# Patient Record
Sex: FEMALE | Race: WHITE | NOT HISPANIC OR LATINO | Employment: UNEMPLOYED | URBAN - NONMETROPOLITAN AREA
[De-identification: names, ages, dates, MRNs, and addresses within clinical notes are randomized per-mention and may not be internally consistent; named-entity substitution may affect disease eponyms.]

---

## 2020-01-14 ENCOUNTER — HOSPITAL ENCOUNTER (INPATIENT)
Facility: HOSPITAL | Age: 30
LOS: 3 days | Discharge: HOME | End: 2020-01-17
Attending: PSYCHIATRY & NEUROLOGY | Admitting: PSYCHIATRY & NEUROLOGY

## 2020-01-14 ENCOUNTER — HOSPITAL ENCOUNTER (EMERGENCY)
Facility: HOSPITAL | Age: 30
Discharge: STILL ADMITTED OR ADMINISTRATIVE | End: 2020-01-14
Attending: FAMILY MEDICINE

## 2020-01-14 VITALS
BODY MASS INDEX: 20.32 KG/M2 | HEIGHT: 72 IN | DIASTOLIC BLOOD PRESSURE: 63 MMHG | HEART RATE: 93 BPM | WEIGHT: 150 LBS | TEMPERATURE: 98.9 F | SYSTOLIC BLOOD PRESSURE: 104 MMHG | OXYGEN SATURATION: 98 % | RESPIRATION RATE: 18 BRPM

## 2020-01-14 DIAGNOSIS — F32.9 MAJOR DEPRESSIVE DISORDER, REMISSION STATUS UNSPECIFIED, UNSPECIFIED WHETHER RECURRENT: Primary | ICD-10-CM

## 2020-01-14 DIAGNOSIS — R45.851 SUICIDAL IDEATION: ICD-10-CM

## 2020-01-14 LAB
6-ACETYL MORPHINE: NEGATIVE
ALBUMIN SERPL-MCNC: 4.26 G/DL (ref 3.5–5.2)
ALBUMIN/GLOB SERPL: 1.2 G/DL
ALP SERPL-CCNC: 82 U/L (ref 39–117)
ALT SERPL W P-5'-P-CCNC: 29 U/L (ref 1–33)
AMPHET+METHAMPHET UR QL: POSITIVE
ANION GAP SERPL CALCULATED.3IONS-SCNC: 12 MMOL/L (ref 5–15)
AST SERPL-CCNC: 31 U/L (ref 1–32)
B-HCG UR QL: NEGATIVE
BARBITURATES UR QL SCN: NEGATIVE
BENZODIAZ UR QL SCN: NEGATIVE
BILIRUB SERPL-MCNC: 0.3 MG/DL (ref 0.2–1.2)
BILIRUB UR QL STRIP: NEGATIVE
BUN BLD-MCNC: 15 MG/DL (ref 6–20)
BUN/CREAT SERPL: 23.8 (ref 7–25)
BUPRENORPHINE SERPL-MCNC: NEGATIVE NG/ML
CALCIUM SPEC-SCNC: 9.7 MG/DL (ref 8.6–10.5)
CANNABINOIDS SERPL QL: NEGATIVE
CHLORIDE SERPL-SCNC: 103 MMOL/L (ref 98–107)
CLARITY UR: CLEAR
CO2 SERPL-SCNC: 25 MMOL/L (ref 22–29)
COCAINE UR QL: NEGATIVE
COLOR UR: YELLOW
CREAT BLD-MCNC: 0.63 MG/DL (ref 0.57–1)
DEPRECATED RDW RBC AUTO: 41.6 FL (ref 37–54)
EOSINOPHIL # BLD MANUAL: 0.2 10*3/MM3 (ref 0–0.4)
EOSINOPHIL NFR BLD MANUAL: 2 % (ref 0.3–6.2)
ERYTHROCYTE [DISTWIDTH] IN BLOOD BY AUTOMATED COUNT: 12.6 % (ref 12.3–15.4)
ETHANOL BLD-MCNC: <10 MG/DL (ref 0–10)
ETHANOL UR QL: <0.01 %
GFR SERPL CREATININE-BSD FRML MDRD: 110 ML/MIN/1.73
GLOBULIN UR ELPH-MCNC: 3.6 GM/DL
GLUCOSE BLD-MCNC: 84 MG/DL (ref 65–99)
GLUCOSE UR STRIP-MCNC: NEGATIVE MG/DL
HCT VFR BLD AUTO: 45 % (ref 34–46.6)
HGB BLD-MCNC: 14.7 G/DL (ref 12–15.9)
HGB UR QL STRIP.AUTO: NEGATIVE
HYPOCHROMIA BLD QL: ABNORMAL
KETONES UR QL STRIP: ABNORMAL
LEUKOCYTE ESTERASE UR QL STRIP.AUTO: NEGATIVE
LYMPHOCYTES # BLD MANUAL: 4.65 10*3/MM3 (ref 0.7–3.1)
LYMPHOCYTES NFR BLD MANUAL: 3 % (ref 5–12)
LYMPHOCYTES NFR BLD MANUAL: 47 % (ref 19.6–45.3)
MAGNESIUM SERPL-MCNC: 1.9 MG/DL (ref 1.6–2.6)
MCH RBC QN AUTO: 29.6 PG (ref 26.6–33)
MCHC RBC AUTO-ENTMCNC: 32.7 G/DL (ref 31.5–35.7)
MCV RBC AUTO: 90.7 FL (ref 79–97)
METHADONE UR QL SCN: NEGATIVE
MONOCYTES # BLD AUTO: 0.3 10*3/MM3 (ref 0.1–0.9)
NEUTROPHILS # BLD AUTO: 4.75 10*3/MM3 (ref 1.7–7)
NEUTROPHILS NFR BLD MANUAL: 48 % (ref 42.7–76)
NITRITE UR QL STRIP: NEGATIVE
OPIATES UR QL: NEGATIVE
OXYCODONE UR QL SCN: NEGATIVE
PCP UR QL SCN: NEGATIVE
PH UR STRIP.AUTO: 6 [PH] (ref 5–8)
PLAT MORPH BLD: NORMAL
PLATELET # BLD AUTO: 380 10*3/MM3 (ref 140–450)
PMV BLD AUTO: 8.8 FL (ref 6–12)
POTASSIUM BLD-SCNC: 4.1 MMOL/L (ref 3.5–5.2)
PROT SERPL-MCNC: 7.9 G/DL (ref 6–8.5)
PROT UR QL STRIP: NEGATIVE
RBC # BLD AUTO: 4.96 10*6/MM3 (ref 3.77–5.28)
SCAN SLIDE: NORMAL
SODIUM BLD-SCNC: 140 MMOL/L (ref 136–145)
SP GR UR STRIP: >1.03 (ref 1–1.03)
UROBILINOGEN UR QL STRIP: ABNORMAL
WBC NRBC COR # BLD: 9.89 10*3/MM3 (ref 3.4–10.8)

## 2020-01-14 PROCEDURE — 80307 DRUG TEST PRSMV CHEM ANLYZR: CPT | Performed by: NURSE PRACTITIONER

## 2020-01-14 PROCEDURE — 83735 ASSAY OF MAGNESIUM: CPT | Performed by: NURSE PRACTITIONER

## 2020-01-14 PROCEDURE — 80053 COMPREHEN METABOLIC PANEL: CPT | Performed by: NURSE PRACTITIONER

## 2020-01-14 PROCEDURE — 85025 COMPLETE CBC W/AUTO DIFF WBC: CPT | Performed by: NURSE PRACTITIONER

## 2020-01-14 PROCEDURE — 85007 BL SMEAR W/DIFF WBC COUNT: CPT | Performed by: NURSE PRACTITIONER

## 2020-01-14 PROCEDURE — 81025 URINE PREGNANCY TEST: CPT | Performed by: NURSE PRACTITIONER

## 2020-01-14 PROCEDURE — 81003 URINALYSIS AUTO W/O SCOPE: CPT | Performed by: NURSE PRACTITIONER

## 2020-01-14 RX ORDER — NICOTINE 21 MG/24HR
1 PATCH, TRANSDERMAL 24 HOURS TRANSDERMAL
Status: DISCONTINUED | OUTPATIENT
Start: 2020-01-15 | End: 2020-01-15

## 2020-01-14 RX ORDER — BENZTROPINE MESYLATE 1 MG/ML
1 INJECTION INTRAMUSCULAR; INTRAVENOUS ONCE AS NEEDED
Status: DISCONTINUED | OUTPATIENT
Start: 2020-01-14 | End: 2020-01-17 | Stop reason: HOSPADM

## 2020-01-14 RX ORDER — MELOXICAM 7.5 MG/1
7.5 TABLET ORAL DAILY
Status: DISCONTINUED | OUTPATIENT
Start: 2020-01-15 | End: 2020-01-17 | Stop reason: HOSPADM

## 2020-01-14 RX ORDER — ECHINACEA PURPUREA EXTRACT 125 MG
2 TABLET ORAL AS NEEDED
Status: DISCONTINUED | OUTPATIENT
Start: 2020-01-14 | End: 2020-01-17 | Stop reason: HOSPADM

## 2020-01-14 RX ORDER — PANTOPRAZOLE SODIUM 40 MG/1
40 TABLET, DELAYED RELEASE ORAL DAILY
Status: DISCONTINUED | OUTPATIENT
Start: 2020-01-15 | End: 2020-01-17 | Stop reason: HOSPADM

## 2020-01-14 RX ORDER — ONDANSETRON 4 MG/1
4 TABLET, FILM COATED ORAL EVERY 6 HOURS PRN
Status: DISCONTINUED | OUTPATIENT
Start: 2020-01-14 | End: 2020-01-17 | Stop reason: HOSPADM

## 2020-01-14 RX ORDER — CYCLOBENZAPRINE HCL 10 MG
5 TABLET ORAL 3 TIMES DAILY PRN
Status: DISCONTINUED | OUTPATIENT
Start: 2020-01-14 | End: 2020-01-16

## 2020-01-14 RX ORDER — PANTOPRAZOLE SODIUM 40 MG/1
40 TABLET, DELAYED RELEASE ORAL DAILY
Status: ON HOLD | COMMUNITY
End: 2020-11-16

## 2020-01-14 RX ORDER — NICOTINE 21 MG/24HR
1 PATCH, TRANSDERMAL 24 HOURS TRANSDERMAL ONCE
Status: DISCONTINUED | OUTPATIENT
Start: 2020-01-14 | End: 2020-01-14 | Stop reason: HOSPADM

## 2020-01-14 RX ORDER — MELOXICAM 7.5 MG/1
7.5 TABLET ORAL DAILY
Status: ON HOLD | COMMUNITY
End: 2020-11-16

## 2020-01-14 RX ORDER — ACETAMINOPHEN 325 MG/1
650 TABLET ORAL EVERY 6 HOURS PRN
Status: DISCONTINUED | OUTPATIENT
Start: 2020-01-14 | End: 2020-01-17 | Stop reason: HOSPADM

## 2020-01-14 RX ORDER — IBUPROFEN 400 MG/1
400 TABLET ORAL EVERY 6 HOURS PRN
Status: DISCONTINUED | OUTPATIENT
Start: 2020-01-14 | End: 2020-01-16

## 2020-01-14 RX ORDER — FAMOTIDINE 20 MG/1
20 TABLET, FILM COATED ORAL 2 TIMES DAILY PRN
Status: DISCONTINUED | OUTPATIENT
Start: 2020-01-14 | End: 2020-01-17 | Stop reason: HOSPADM

## 2020-01-14 RX ORDER — CYCLOBENZAPRINE HCL 5 MG
5 TABLET ORAL 3 TIMES DAILY PRN
Status: ON HOLD | COMMUNITY
End: 2020-11-16

## 2020-01-14 RX ORDER — BENZTROPINE MESYLATE 1 MG/1
2 TABLET ORAL ONCE AS NEEDED
Status: DISCONTINUED | OUTPATIENT
Start: 2020-01-14 | End: 2020-01-17 | Stop reason: HOSPADM

## 2020-01-14 RX ORDER — TRAZODONE HYDROCHLORIDE 50 MG/1
50 TABLET ORAL NIGHTLY PRN
Status: DISCONTINUED | OUTPATIENT
Start: 2020-01-14 | End: 2020-01-15

## 2020-01-14 RX ORDER — ALUMINA, MAGNESIA, AND SIMETHICONE 2400; 2400; 240 MG/30ML; MG/30ML; MG/30ML
15 SUSPENSION ORAL EVERY 6 HOURS PRN
Status: DISCONTINUED | OUTPATIENT
Start: 2020-01-14 | End: 2020-01-17 | Stop reason: HOSPADM

## 2020-01-14 RX ORDER — HYDROXYZINE 50 MG/1
50 TABLET, FILM COATED ORAL EVERY 6 HOURS PRN
Status: DISCONTINUED | OUTPATIENT
Start: 2020-01-14 | End: 2020-01-17 | Stop reason: HOSPADM

## 2020-01-14 RX ORDER — LOPERAMIDE HYDROCHLORIDE 2 MG/1
2 CAPSULE ORAL
Status: DISCONTINUED | OUTPATIENT
Start: 2020-01-14 | End: 2020-01-17 | Stop reason: HOSPADM

## 2020-01-14 RX ORDER — BENZONATATE 100 MG/1
100 CAPSULE ORAL 3 TIMES DAILY PRN
Status: DISCONTINUED | OUTPATIENT
Start: 2020-01-14 | End: 2020-01-17 | Stop reason: HOSPADM

## 2020-01-14 RX ADMIN — ALUMINUM HYDROXIDE, MAGNESIUM HYDROXIDE, AND DIMETHICONE 15 ML: 400; 400; 40 SUSPENSION ORAL at 23:46

## 2020-01-14 RX ADMIN — TRAZODONE HYDROCHLORIDE 50 MG: 50 TABLET ORAL at 23:46

## 2020-01-14 RX ADMIN — ACETAMINOPHEN 650 MG: 325 TABLET ORAL at 23:46

## 2020-01-14 RX ADMIN — NICOTINE 1 PATCH: 21 PATCH, EXTENDED RELEASE TRANSDERMAL at 22:20

## 2020-01-15 PROBLEM — B19.20 HEPATITIS C: Status: ACTIVE | Noted: 2020-01-15

## 2020-01-15 PROBLEM — F15.20 METHAMPHETAMINE USE DISORDER, SEVERE (HCC): Status: ACTIVE | Noted: 2020-01-15

## 2020-01-15 PROBLEM — F32.A ACUTE DEPRESSION: Status: ACTIVE | Noted: 2020-01-14

## 2020-01-15 PROBLEM — F11.20 OPIOID USE DISORDER, SEVERE, DEPENDENCE (HCC): Status: ACTIVE | Noted: 2020-01-15

## 2020-01-15 LAB
CRP SERPL-MCNC: 0.16 MG/DL (ref 0–0.5)
HAV IGM SERPL QL IA: ABNORMAL
HBV CORE IGM SERPL QL IA: ABNORMAL
HBV SURFACE AG SERPL QL IA: ABNORMAL
HCV AB SER DONR QL: REACTIVE
HIV1+2 AB SER QL: NORMAL
HOLD SPECIMEN: NORMAL
TSH SERPL DL<=0.05 MIU/L-ACNC: 0.65 UIU/ML (ref 0.27–4.2)

## 2020-01-15 PROCEDURE — 82607 VITAMIN B-12: CPT | Performed by: HOSPITALIST

## 2020-01-15 PROCEDURE — 99254 IP/OBS CNSLTJ NEW/EST MOD 60: CPT | Performed by: HOSPITALIST

## 2020-01-15 PROCEDURE — G0432 EIA HIV-1/HIV-2 SCREEN: HCPCS | Performed by: PSYCHIATRY & NEUROLOGY

## 2020-01-15 PROCEDURE — 86140 C-REACTIVE PROTEIN: CPT | Performed by: HOSPITALIST

## 2020-01-15 PROCEDURE — 82746 ASSAY OF FOLIC ACID SERUM: CPT | Performed by: HOSPITALIST

## 2020-01-15 PROCEDURE — 99223 1ST HOSP IP/OBS HIGH 75: CPT | Performed by: PSYCHIATRY & NEUROLOGY

## 2020-01-15 PROCEDURE — 84443 ASSAY THYROID STIM HORMONE: CPT | Performed by: HOSPITALIST

## 2020-01-15 PROCEDURE — 93010 ELECTROCARDIOGRAM REPORT: CPT | Performed by: INTERNAL MEDICINE

## 2020-01-15 PROCEDURE — 80074 ACUTE HEPATITIS PANEL: CPT | Performed by: PSYCHIATRY & NEUROLOGY

## 2020-01-15 PROCEDURE — 93005 ELECTROCARDIOGRAM TRACING: CPT | Performed by: PSYCHIATRY & NEUROLOGY

## 2020-01-15 RX ORDER — MIRTAZAPINE 15 MG/1
15 TABLET, FILM COATED ORAL NIGHTLY
Status: DISCONTINUED | OUTPATIENT
Start: 2020-01-15 | End: 2020-01-17 | Stop reason: HOSPADM

## 2020-01-15 RX ORDER — NICOTINE 21 MG/24HR
1 PATCH, TRANSDERMAL 24 HOURS TRANSDERMAL
Status: DISCONTINUED | OUTPATIENT
Start: 2020-01-15 | End: 2020-01-17 | Stop reason: HOSPADM

## 2020-01-15 RX ORDER — RISPERIDONE 0.25 MG/1
0.25 TABLET ORAL EVERY 12 HOURS SCHEDULED
Status: DISCONTINUED | OUTPATIENT
Start: 2020-01-15 | End: 2020-01-16

## 2020-01-15 RX ADMIN — MELOXICAM 7.5 MG: 7.5 TABLET ORAL at 08:55

## 2020-01-15 RX ADMIN — CYCLOBENZAPRINE 5 MG: 10 TABLET, FILM COATED ORAL at 16:57

## 2020-01-15 RX ADMIN — NICOTINE 1 PATCH: 14 PATCH TRANSDERMAL at 13:23

## 2020-01-15 RX ADMIN — ACETAMINOPHEN 650 MG: 325 TABLET ORAL at 16:59

## 2020-01-15 RX ADMIN — MIRTAZAPINE 15 MG: 15 TABLET, FILM COATED ORAL at 20:18

## 2020-01-15 RX ADMIN — CYCLOBENZAPRINE 5 MG: 10 TABLET, FILM COATED ORAL at 08:55

## 2020-01-15 RX ADMIN — IBUPROFEN 400 MG: 400 TABLET, FILM COATED ORAL at 20:18

## 2020-01-15 RX ADMIN — MUPIROCIN 1 APPLICATION: 20 OINTMENT TOPICAL at 20:19

## 2020-01-15 RX ADMIN — RISPERIDONE 0.25 MG: 0.25 TABLET, FILM COATED ORAL at 11:45

## 2020-01-15 RX ADMIN — MUPIROCIN: 20 OINTMENT TOPICAL at 11:45

## 2020-01-15 RX ADMIN — RISPERIDONE 0.25 MG: 0.25 TABLET, FILM COATED ORAL at 20:18

## 2020-01-15 RX ADMIN — PANTOPRAZOLE SODIUM 40 MG: 40 TABLET, DELAYED RELEASE ORAL at 08:55

## 2020-01-15 RX ADMIN — HYDROXYZINE HYDROCHLORIDE 50 MG: 50 TABLET ORAL at 20:18

## 2020-01-15 NOTE — NURSING NOTE
Phone contact with dr salter. Presented all clinicals/labs. Orders received. Admit to A/E. S/P 3. Routine orders. Orders read back and confirmed. Pt advised of admission; pt status unchanged at this time.

## 2020-01-15 NOTE — ED PROVIDER NOTES
Subjective   Patient is a 29-year-old female that presents to the ED for depression.  The patient says she has been depressed for several months but it is gradually getting worse.  She says that she does not care if she lives or dies.  She says that she is unable to eat and unable to sleep.  She says that she has been on medication previously and nothing seems to help.  She says that she has had suicidal thoughts.  She says that she does not want to be here and the only reason that she is here is because her family made her come.      History provided by:  Patient   used: No    Suicidal   Presenting symptoms: depression, hallucinations, suicidal thoughts and suicidal threats    Degree of incapacity (severity):  Moderate  Onset quality:  Gradual  Timing:  Constant  Progression:  Waxing and waning  Chronicity:  Recurrent  Context: drug abuse and noncompliance    Relieved by:  Nothing  Worsened by:  Nothing  Ineffective treatments:  None tried  Associated symptoms: anxiety, appetite change, feelings of worthlessness, insomnia and irritability    Risk factors: family hx of mental illness, hx of mental illness and hx of suicide attempts        Review of Systems   Constitutional: Positive for appetite change and irritability.   HENT: Negative.    Eyes: Negative.    Respiratory: Negative.    Cardiovascular: Negative.    Gastrointestinal: Negative.    Endocrine: Negative.    Genitourinary: Negative.    Musculoskeletal: Negative.    Skin: Negative.    Allergic/Immunologic: Negative.    Neurological: Negative.    Hematological: Negative.    Psychiatric/Behavioral: Positive for decreased concentration, hallucinations and suicidal ideas. The patient is nervous/anxious and has insomnia.    All other systems reviewed and are negative.      No past medical history on file.    Allergies   Allergen Reactions   • Penicillins Hives   • Triple Antibiotic [Bacitracin-Neomycin-Polymyxin] Hives       No past surgical  history on file.    No family history on file.    Social History     Socioeconomic History   • Marital status:      Spouse name: Not on file   • Number of children: Not on file   • Years of education: Not on file   • Highest education level: Not on file           Objective   Physical Exam   Constitutional: She is oriented to person, place, and time. She appears well-developed and well-nourished.   HENT:   Head: Normocephalic.   Eyes: Pupils are equal, round, and reactive to light. EOM are normal.   Neck: Normal range of motion. Neck supple.   Cardiovascular: Normal rate, regular rhythm, normal heart sounds and intact distal pulses.   Pulmonary/Chest: Effort normal and breath sounds normal.   Abdominal: Soft. Bowel sounds are normal.   Musculoskeletal: Normal range of motion.   Neurological: She is alert and oriented to person, place, and time.   Skin: Skin is warm. Capillary refill takes less than 2 seconds.   Psychiatric: Her mood appears anxious. Her speech is tangential. She is agitated and hyperactive. Thought content is paranoid. Cognition and memory are normal. She expresses impulsivity. She exhibits a depressed mood. She expresses suicidal ideation.   Nursing note and vitals reviewed.      Procedures           ED Course                                               MDM    Final diagnoses:   Major depressive disorder, remission status unspecified, unspecified whether recurrent   Suicidal ideation            Nickolas Greene, APRN  01/15/20 0324
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
## 2020-01-15 NOTE — CONSULTS
[facility] HOSPITALIST CONSULT NOTE     Consults    Patient Identification:  Name:  Laurel Fu  Age:  29 y.o.  Sex:  female  :  1990  MRN:  [id number]  Visit Number:  [id number]  Primary care provider:  Provider, No Known    Reason for Consult: Back pain and leg pain    History of presenting illness: Exam is assisted by the units female nurse, patient is in bed, reports she has different sensation of her legs associated with thick and tight sensation especially her right lower extremity along the lateral aspect of the legs.  She reports this has been going on for some time but worsened this past few days she reports she had a fall approximately 2 weeks ago at home and landed on her sacrococcygeal area causing pain and swelling with bruising.  Apparently she was seen in the emergency room at [facility] approximately week ago where she was told she has some swelling and was treated with NSAIDs.  Patient denies incontinence, denies leg swelling, fever or chills, she reports tenderness and pain especially on her right sacroiliac, coccyx area area and right hip.  Patient denies history of similar symptoms in the past.    Patient is admitted at treatment Center for depression, patient reports her last IV drug use was approximately a week ago consisting of methamphetamines.  ---------------------------------------------------------------------------------------------------------------------  Review of Systems:   Review of Systems   Constitutional: Positive for fatigue. Negative for appetite change, chills, diaphoresis, fever and unexpected weight change.   HENT: Negative.    Eyes: Negative.    Respiratory: Negative.    Cardiovascular: Negative.    Gastrointestinal: Negative.    Endocrine: Negative.    Genitourinary: Negative.    Musculoskeletal: Positive for back pain. Negative for arthralgias, gait problem, joint swelling, myalgias, neck pain and neck stiffness.   Skin: Negative.     Allergic/Immunologic: Negative.    Neurological: Negative.    Hematological: Negative.    Psychiatric/Behavioral: Positive for dysphoric mood, sleep disturbance and suicidal ideas. Negative for agitation, behavioral problems, confusion, decreased concentration, hallucinations and self-injury. The patient is not nervous/anxious and is not hyperactive.       ---------------------------------------------------------------------------------------------------------------------   Past History:  Family History   Problem Relation Age of Onset   • Depression Mother    • Anxiety disorder Mother    • Bipolar disorder Mother    • Alcohol abuse Maternal Grandmother    • Schizophrenia Maternal Grandmother      Past Medical History:   Diagnosis Date   • Depression    • Obsessive-compulsive disorder    • Psychosis (CMS/[facility])     visual pretty much all the time   • PTSD (post-traumatic stress disorder)    • Seizures (CMS/HCC)     states when she stopped opiates a long time ago     Past Surgical History:   Procedure Laterality Date   • APPENDECTOMY  2008   • HYSTERECTOMY  2017   • LAPAROSCOPIC TUBAL LIGATION  2013     Social History     Socioeconomic History   • Marital status:      Spouse name: Not on file   • Number of children: Not on file   • Years of education: Not on file   • Highest education level: Not on file   Tobacco Use   • Smoking status: Current Every Day Smoker     Packs/day: 1.00     Years: 17.00     Pack years: 17.00     Types: Cigarettes   • Smokeless tobacco: Never Used   Substance and Sexual Activity   • Alcohol use: Not Currently   • Drug use: Yes     Types: Heroin, Methamphetamines   • Sexual activity: Yes     Partners: Male     ---------------------------------------------------------------------------------------------------------------------   Allergies:  Penicillins and Triple antibiotic  [bacitracin-neomycin-polymyxin]  ---------------------------------------------------------------------------------------------------------------------   Prior to Admission Medications     Prescriptions Last Dose Informant Patient Reported? Taking?    cyclobenzaprine (FLEXERIL) 5 MG tablet 1/14/2020 Self Yes Yes    Take 5 mg by mouth 3 (Three) Times a Day As Needed for Muscle Spasms.    meloxicam (MOBIC) 7.5 MG tablet 1/14/2020 Self Yes Yes    Take 7.5 mg by mouth Daily.    pantoprazole (PROTONIX) 40 MG EC tablet 1/14/2020 Self Yes Yes    Take 40 mg by mouth Daily.        Hospital Meds:    meloxicam 7.5 mg Oral Daily   mirtazapine 15 mg Oral Nightly   mupirocin  Topical Q12H   nicotine 1 patch Transdermal Q24H   pantoprazole 40 mg Oral Daily   risperiDONE 0.25 mg Oral Q12H        ---------------------------------------------------------------------------------------------------------------------   Vital Signs:  Temp:  [97.1 °F (36.2 °C)-98.9 °F (37.2 °C)] 98.5 °F (36.9 °C)  Heart Rate:  [] 98  Resp:  [18] 18  BP: (104-134)/(57-78) 106/57      01/14/20  2305   Weight: 63.8 kg (140 lb 9.6 oz)     Body mass index is 19.07 kg/m².  ---------------------------------------------------------------------------------------------------------------------   Physical exam:  General: Comfortable,awake, alert, oriented to self, place, and time, well-developed, she is not in obvious respiratory stress, she follows commands and conversant.      Skin:  Skin is warm and dry. No rash noted. No pallor.  She has multiple needle tracks both antecubital area, no obvious signs of infection or redness.  No tenderness.  HENT:  Head:  Normocephalic and atraumatic.  Mouth:  Moist mucous membranes.    Eyes:  Conjunctivae and EOM are normal.  Pupils are equal, round, and reactive to light.  No scleral icterus.  No nystagmus  Neck:  Neck supple.  No JVD present.  No lymphadenopathy no bruit no palpable mass  Pulmonary/Chest:  No respiratory  distress, no wheezes, no crackles, with normal breath sounds and good air movement.  Cardiovascular:  Normal rate, regular rhythm and normal heart sounds with no murmur.  Abdominal:  Soft.  Bowel sounds are normal.  No distension and no tenderness.  No hepatosplenomegaly  Extremities:  No edema, no tenderness, and no deformity.  No red or swollen joints anywhere.  Strong pulses in all 4 extremities with no clubbing, no cyanosis.  She has hematoma greenish in color right anterior thigh at least 2 approximately 1-1/2 inches in size she thinks she may have hit herself, also anterior leg right side midportion.  Neurological:  Motor strength equal no obvious deficit, sensory grossly intact.   No cranial nerve deficit.  No tongue deviation.  No facial droop.  No slurred speech.  On detailed exam especially in the lower extremity, she has good position sense, equal tactile sensation, equal painful sensation, no straight leg raising, strength is equal.  DTR is equal both knee and ankle.  Back: No obvious deformity, no limitation of range of motion, no bruising, no skin break, he has subjective tenderness in the site right sacroiliac area, also tenderness on the coccygeal area.  ---------------------------------------------------------------------------------------------------------------------   ---------------------------------------------------------------------------------------------------------------------   Results from last 7 days   Lab Units 01/14/20 2053   WBC 10*3/mm3 9.89   HEMOGLOBIN g/dL 14.7   HEMATOCRIT % 45.0   MCV fL 90.7   MCHC g/dL 32.7   PLATELETS 10*3/mm3 380         Results from last 7 days   Lab Units 01/14/20  2053   SODIUM mmol/L 140   POTASSIUM mmol/L 4.1   MAGNESIUM mg/dL 1.9   CHLORIDE mmol/L 103   CO2 mmol/L 25.0   BUN mg/dL 15   CREATININE mg/dL 0.63   EGFR IF NONAFRICN AM mL/min/1.73 110   CALCIUM mg/dL 9.7   GLUCOSE mg/dL 84   ALBUMIN g/dL 4.26   BILIRUBIN mg/dL 0.3   ALK PHOS U/L 82   AST  (SGOT) U/L 31   ALT (SGPT) U/L 29   Estimated Creatinine Clearance: 130.3 mL/min (by C-G formula based on SCr of 0.63 mg/dL).  No results found for: AMMONIA          No results found for: HGBA1C  No results found for: TSH, FREET4  Lab Results   Component Value Date    PREGTESTUR Negative 01/14/2020     Pain Management Panel     Pain Management Panel Latest Ref Rng & Units 1/14/2020    AMPHETAMINES SCREEN, URINE Negative Positive(A)    BARBITURATES SCREEN Negative Negative    BENZODIAZEPINE SCREEN, URINE Negative Negative    BUPRENORPHINEUR Negative Negative    COCAINE SCREEN, URINE Negative Negative    METHADONE SCREEN, URINE Negative Negative        No results found for: BLOODCX  No results found for: URINECX  No results found for: WOUNDCX  No results found for: STOOLCX      EKG:       ---------------------------------------------------------------------------------------------------------------------   Imaging Results (Last 7 Days)     ** No results found for the last 168 hours. **        ----------------------------------------------------------------------------------------------------------------------  Assessment and Plan:  -Back pain/right sacroiliac and coccygeal area after a fall  -Right lower extremity paresthesia  -Hepatitis C likely due to intravenous drug use  -IV drug use including opiates and methamphetamines  -Acute depression per psychiatry    On exam patient does not have any obvious findings to suggest DVT, will start with x-ray of the lumbar spine, chest x-ray, TSH B12 and folate will be ordered, CRP.  Further work-up will be needed depending on the results of the initial tests ordered.  With regards to her hepatitis C, liver function test is normal, patient is advised to have an appointment with her primary care provider for GI referral for treatment of hepatitis but most importantly before being treated she needs to discontinue her IV drug use.    Thank you for consulting will follow the  patient with you closely.    Sammie Jones MD  01/15/20  6:35 PM

## 2020-01-15 NOTE — NURSING NOTE
1745 PATIENT C/O RIGHT CALF PAIN. PATIENT HAS EDEMA IN BILATERAL LOWER ANKLES. NO REDNESS, HEAT OR SWELLING NOTED IN EITHER LEG. PATIENT ADVISED TO ELEVATE FEET AND REST. V/S ARE STABLE. SEE CHART. DR. SWATHI HELMS NOTIFIED AND NEW ORDERS RECEIVED.

## 2020-01-15 NOTE — NURSING NOTE
Pt presents to intake reporting depression, talia, suicidal thoughts, with hx of od attempts x 2 in 12/2018, and this would likely be the plan again. States if she doesn't get help to get better, she will definitely make sure the next time.  Reports poor sleep and appetite, visual hallucinations, has no positive support, reporting she is past the point of hopeless. Admits to drug use past couple months to try to make herself feel better.   Pt searched, changed to hosp scrubs, belongings secured and placed in cabinet, and pt seated in monitored rm.   Per request, pt given sandwich box, and Gatorade.

## 2020-01-15 NOTE — PLAN OF CARE
Problem: Patient Care Overview  Goal: Plan of Care Review  Outcome: Ongoing (interventions implemented as appropriate)  Flowsheets  Taken 1/15/2020 1425  Progress: no change  Taken 1/15/2020 1051  Plan of Care Reviewed With: patient  Patient Agreement with Plan of Care: agrees  Note:   PATIENT VERBALIZES ANXIETY, DEPRESSION, AUDITORY HALLUCINATIONS AND SI WITH NO PLAN. PATIENT IS AOX3 AND ON FALLS PRECAUTIONS. PATIENT APPEARS STABLE WITH NO ACUTE S/S OF DISTRESS NOTED. NEW ORDERS: D/C TRAZODONE. BACTROBAN OINTMENT, REMERON 15MG, RISPERDAL 0.25MG, FLP
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
## 2020-01-16 ENCOUNTER — APPOINTMENT (OUTPATIENT)
Dept: GENERAL RADIOLOGY | Facility: HOSPITAL | Age: 30
End: 2020-01-16

## 2020-01-16 LAB
CHOLEST SERPL-MCNC: 131 MG/DL (ref 0–200)
FOLATE SERPL-MCNC: 5.91 NG/ML (ref 4.78–24.2)
HDLC SERPL-MCNC: 38 MG/DL (ref 40–60)
LDLC SERPL CALC-MCNC: 66 MG/DL (ref 0–100)
LDLC/HDLC SERPL: 1.75 {RATIO}
TRIGL SERPL-MCNC: 133 MG/DL (ref 0–150)
VIT B12 BLD-MCNC: 469 PG/ML (ref 211–946)
VLDLC SERPL-MCNC: 26.6 MG/DL

## 2020-01-16 PROCEDURE — 71046 X-RAY EXAM CHEST 2 VIEWS: CPT | Performed by: RADIOLOGY

## 2020-01-16 PROCEDURE — 72110 X-RAY EXAM L-2 SPINE 4/>VWS: CPT | Performed by: RADIOLOGY

## 2020-01-16 PROCEDURE — 99232 SBSQ HOSP IP/OBS MODERATE 35: CPT | Performed by: HOSPITALIST

## 2020-01-16 PROCEDURE — 99232 SBSQ HOSP IP/OBS MODERATE 35: CPT | Performed by: PSYCHIATRY & NEUROLOGY

## 2020-01-16 PROCEDURE — 72110 X-RAY EXAM L-2 SPINE 4/>VWS: CPT

## 2020-01-16 PROCEDURE — 80061 LIPID PANEL: CPT | Performed by: PSYCHIATRY & NEUROLOGY

## 2020-01-16 PROCEDURE — 71046 X-RAY EXAM CHEST 2 VIEWS: CPT

## 2020-01-16 RX ORDER — RISPERIDONE 0.25 MG/1
0.5 TABLET ORAL EVERY 12 HOURS SCHEDULED
Status: DISCONTINUED | OUTPATIENT
Start: 2020-01-16 | End: 2020-01-17 | Stop reason: HOSPADM

## 2020-01-16 RX ORDER — CYCLOBENZAPRINE HCL 10 MG
10 TABLET ORAL 3 TIMES DAILY PRN
Status: DISCONTINUED | OUTPATIENT
Start: 2020-01-16 | End: 2020-01-17 | Stop reason: HOSPADM

## 2020-01-16 RX ADMIN — PANTOPRAZOLE SODIUM 40 MG: 40 TABLET, DELAYED RELEASE ORAL at 08:44

## 2020-01-16 RX ADMIN — IBUPROFEN 400 MG: 400 TABLET, FILM COATED ORAL at 10:06

## 2020-01-16 RX ADMIN — HYDROXYZINE HYDROCHLORIDE 50 MG: 50 TABLET ORAL at 20:26

## 2020-01-16 RX ADMIN — CYCLOBENZAPRINE HYDROCHLORIDE 10 MG: 10 TABLET, FILM COATED ORAL at 20:26

## 2020-01-16 RX ADMIN — RISPERIDONE 0.5 MG: 0.25 TABLET, FILM COATED ORAL at 20:26

## 2020-01-16 RX ADMIN — MUPIROCIN: 20 OINTMENT TOPICAL at 20:28

## 2020-01-16 RX ADMIN — MIRTAZAPINE 15 MG: 15 TABLET, FILM COATED ORAL at 20:26

## 2020-01-16 RX ADMIN — NICOTINE 1 PATCH: 14 PATCH TRANSDERMAL at 11:00

## 2020-01-16 RX ADMIN — LOPERAMIDE HYDROCHLORIDE 2 MG: 2 CAPSULE ORAL at 10:06

## 2020-01-16 RX ADMIN — MUPIROCIN: 20 OINTMENT TOPICAL at 08:43

## 2020-01-16 RX ADMIN — ACETAMINOPHEN 650 MG: 325 TABLET ORAL at 20:26

## 2020-01-16 RX ADMIN — RISPERIDONE 0.25 MG: 0.25 TABLET, FILM COATED ORAL at 08:44

## 2020-01-16 RX ADMIN — MELOXICAM 7.5 MG: 7.5 TABLET ORAL at 08:43

## 2020-01-16 NOTE — PROGRESS NOTES
[facility]     Patient Identification:  Name:  Laurel Fu  Age:  29 y.o.  Sex:  female  :  1990  MRN:  [id number]  Visit Number:  [id number]  Primary Care Provider:  Provider, No Known    Length of stay:  2    Subjective: Patient seen and examined assisted by psychiatry unit nursing staff, patient is reporting persistence of her back pain mostly right sacroiliac with tenderness, pain is bearable, denies leg pain denies leg cramps.  Chest x-ray PA and lateral views unremarkable, CRP is normal, lumbosacral x-ray was also unremarkable.  My neurologic exam yesterday was unremarkable.    Chief Complaint: Back pain  ----------------------------------------------------------------------------------------------------------------------  Current Hospital Meds:    meloxicam 7.5 mg Oral Daily   mirtazapine 15 mg Oral Nightly   mupirocin  Topical Q12H   nicotine 1 patch Transdermal Q24H   pantoprazole 40 mg Oral Daily   risperiDONE 0.5 mg Oral Q12H        ----------------------------------------------------------------------------------------------------------------------  Vital Signs:  Temp:  [97.9 °F (36.6 °C)-98.5 °F (36.9 °C)] 98.3 °F (36.8 °C)  Heart Rate:  [] 90  Resp:  [18] 18  BP: ()/(57-83) 116/83       Tele:       20  2305   Weight: 63.8 kg (140 lb 9.6 oz)     Body mass index is 19.07 kg/m².  No intake or output data in the 24 hours ending 20 1738  Diet Regular  ----------------------------------------------------------------------------------------------------------------------  Physical exam:  General: Comfortable,awake, alert, oriented to self, place, and time, well-developed and well-nourished.  No respiratory distress.    Skin:  Skin is warm and dry. No rash noted. No pallor.    HENT:  Head:  Normocephalic and atraumatic.  Mouth:  Moist mucous membranes.    Eyes:  Conjunctivae and EOM are normal.  Pupils are equal, round, and reactive to  light.  No scleral icterus.    Neck:  Neck supple.  No JVD present.  No lymphadenopathy  Pulmonary/Chest:  No respiratory distress, no wheezes, no crackles, with normal breath sounds and good air movement.  Cardiovascular:  Normal rate, regular rhythm and normal heart sounds with no murmur.  Abdominal:  Soft.  Bowel sounds are normal.  No distension and no tenderness.   Extremities:  No edema, no tenderness, and no deformity.  No red or swollen joints anywhere.  Strong pulses in all 4 extremities with no clubbing, no cyanosis, she has needle tracks both antecubital, no erythema no signs of infection noted.  Neurological:  Motor strength equal no obvious deficit, sensory grossly intact.   No cranial nerve deficit.  No tongue deviation.  No facial droop.  No slurred speech.  No straight leg raising, sensory both lower extremities intact no symmetry, motor strength is equal.  Back: Subjective mild tenderness lower back mostly right sacroiliac area no deformity no redness no asymmetry.  ----------------------------------------------------------------------------------------------------------------------  ----------------------------------------------------------------------------------------------------------------------      Results from last 7 days   Lab Units 01/15/20  1931 01/14/20  2053   CRP mg/dL 0.16  --    WBC 10*3/mm3  --  9.89   HEMOGLOBIN g/dL  --  14.7   HEMATOCRIT %  --  45.0   MCV fL  --  90.7   MCHC g/dL  --  32.7   PLATELETS 10*3/mm3  --  380         Results from last 7 days   Lab Units 01/14/20 2053   SODIUM mmol/L 140   POTASSIUM mmol/L 4.1   MAGNESIUM mg/dL 1.9   CHLORIDE mmol/L 103   CO2 mmol/L 25.0   BUN mg/dL 15   CREATININE mg/dL 0.63   EGFR IF NONAFRICN AM mL/min/1.73 110   CALCIUM mg/dL 9.7   GLUCOSE mg/dL 84   ALBUMIN g/dL 4.26   BILIRUBIN mg/dL 0.3   ALK PHOS U/L 82   AST (SGOT) U/L 31   ALT (SGPT) U/L 29   Estimated Creatinine Clearance: 130.3 mL/min (by C-G formula based on SCr of 0.63  mg/dL).    No results found for: AMMONIA  Results from last 7 days   Lab Units 01/16/20  0521   CHOLESTEROL mg/dL 131   TRIGLYCERIDES mg/dL 133   HDL CHOL mg/dL 38*   LDL CHOL mg/dL 66     No results found for: BLOODCX  No results found for: URINECX  No results found for: WOUNDCX  No results found for: STOOLCX    I have personally looked at the labs and they are summarized above.  ----------------------------------------------------------------------------------------------------------------------  Imaging Results (Last 24 Hours)     Procedure Component Value Units Date/Time    XR Chest PA & Lateral [[id number]] Collected:  01/16/20 1523     Updated:  01/16/20 1526    Narrative:       EXAMINATION: XR CHEST PA AND LATERAL-      CLINICAL INDICATION:     back pain     TECHNIQUE:  XR CHEST PA AND LATERAL-      COMPARISON: NONE      FINDINGS:      Lungs are aerated.   Heart size, mediastinum, and pulmonary vascularity are unremarkable.   No pneumothorax.   No effusions.   No acute osseous findings.          Impression:       No radiographic evidence of acute cardiac or pulmonary  disease.     This report was finalized on 1/16/2020 3:24 PM by Dr. Gus Cabral MD.       XR Spine Lumbar Complete 4+VW [[id number]] Collected:  01/16/20 1523     Updated:  01/16/20 1525    Narrative:       EXAMINATION: XR SPINE LUMBAR COMPLETE 4+VW-      CLINICAL INDICATION: fall, back pain      TECHNIQUE: AP, lateral, and bilateral oblique X-rays of the lumbosacral  spine.      COMPARISON: NONE.      FINDINGS: Vertebral body height and alignment are within normal limits.  Posterior elements appear intact. Pedicles appear relatively symmetric.  Oblique views demonstrate no bony neural foraminal stenosis or evidence  of spondylolysis.       Impression:       Unremarkable exam.     This report was finalized on 1/16/2020 3:23 PM by Dr. Gus Cabral MD.            ----------------------------------------------------------------------------------------------------------------------  Assessment and Plan:  -Back pain sacroiliac area after trauma no obvious finding on x-ray  -Subjective right lower extremity paresthesia unremarkable on my exam  -Hepatitis C likely from IV drug use with normal liver function test  -Depression with suicidal ideation per psychiatry  -IV drug use including methamphetamines and opiates    Regarding her back pain her x-ray is unremarkable, no signs of trauma or sacroiliac injury, high to lumbar spine is unremarkable, would recommend NSAIDs which the patient is already started on Mobic, I would discontinue treatment, with regards to her hepatitis C patient was counseled the importance of cessation of IV drug use and to have a family doctor before discharge so she can be referred to a GI clinic for hepatitis C treatment.  With patient on Mobic daily I agree with Protonix 40 mg daily. If patient back pain is not controlled with 7.5 mg of Mobic we can increase it to 15 mg daily as needed.          Sammie Jones MD  01/16/20  5:38 PM

## 2020-01-16 NOTE — PROGRESS NOTES
"INPATIENT PSYCHIATRIC PROGRESS NOTE    Name:  Laurel Fu  :  1990  MRN:  [id number]  Visit Number:  [id number]  Length of stay:  2    SUBJECTIVE  CC/Focus of Exam: depression and psychosis    INTERVAL HISTORY:  The patient states she is not feeling good. States that her lower back really hurts, had diarrhea all night, has stomach cramps, cannot see her  and kids until Saturday and would just like to go home.  Depression rating 6/10  Anxiety rating 7/10  Sleep: poor  Withdrawal sx: back ache, diarrhea, stomach cramps  Cravin/10    Review of Systems   Respiratory: Negative.    Cardiovascular: Negative.    Gastrointestinal: Positive for abdominal pain and diarrhea.   Musculoskeletal: Positive for back pain.       OBJECTIVE    Temp:  [97.9 °F (36.6 °C)-98.6 °F (37 °C)] 98.3 °F (36.8 °C)  Heart Rate:  [] 90  Resp:  [18] 18  BP: ()/(57-83) 116/83    MENTAL STATUS EXAM:  Appearance:Casually dressed, good hygeine.   Cooperation:Cooperative  Psychomotor: No psychomotor agitation/retardation, No EPS, No motor tics  Speech-normal rate, amount.  Mood \"depressed and anxious\"   Affect-congruent, appropriate, stable  Thought Content-goal directed, paranoid delusional material present  Thought process-linear, organized.  Suicidality: No SI  Homicidality: No HI  Perception: No AH/VH  Insight-limited   Judgement-limited    Lab Results (last 24 hours)     Procedure Component Value Units Date/Time    Lipid Panel [[id number]]  (Abnormal) Collected:  20    Specimen:  Blood Updated:  20 0600     Total Cholesterol 131 mg/dL      Triglycerides 133 mg/dL      HDL Cholesterol 38 mg/dL      LDL Cholesterol  66 mg/dL      VLDL Cholesterol 26.6 mg/dL      LDL/HDL Ratio 1.75    Narrative:       Cholesterol Reference Ranges  (U.S. Department of Health and Human Services ATP III Classifications)    Desirable          <200 mg/dL  Borderline High    200-239 mg/dL  High Risk          >240 " mg/dL      Triglyceride Reference Ranges  (U.S. Department of Health and Human Services ATP III Classifications)    Normal           <150 mg/dL  Borderline High  150-199 mg/dL  High             200-499 mg/dL  Very High        >500 mg/dL    HDL Reference Ranges  (U.S. Department of Health and Human Services ATP III Classifcations)    Low     <40 mg/dl (major risk factor for CHD)  High    >60 mg/dl ('negative' risk factor for CHD)        LDL Reference Ranges  (U.S. Department of Health and Human Services ATP III Classifcations)    Optimal          <100 mg/dL  Near Optimal     100-129 mg/dL  Borderline High  130-159 mg/dL  High             160-189 mg/dL  Very High        >189 mg/dL    Vitamin B12 [[id number]]  (Normal) Collected:  01/15/20 1931    Specimen:  Blood Updated:  01/16/20 0248     Vitamin B-12 469 pg/mL     Narrative:       Results may be falsely increased if patient taking Biotin.      Folate [[id number]]  (Normal) Collected:  01/15/20 1931    Specimen:  Blood Updated:  01/16/20 0248     Folate 5.91 ng/mL     Narrative:       Results may be falsely increased if patient taking Biotin.      TSH [[id number]]  (Normal) Collected:  01/15/20 1931    Specimen:  Blood Updated:  01/15/20 2012     TSH 0.647 uIU/mL     C-reactive Protein [[id number]]  (Normal) Collected:  01/15/20 1931    Specimen:  Blood Updated:  01/15/20 2005     C-Reactive Protein 0.16 mg/dL     Hepatitis Panel, Acute [[id number]] Collected:  01/15/20 0741    Specimen:  Blood Updated:  01/15/20 1946    Narrative:       The following orders were created for panel order Hepatitis Panel, Acute.  Procedure                               Abnormality         Status                     ---------                               -----------         ------                     Hepatitis Panel, Acute[[id number]]       Abnormal            Final result               Hep B Confirmation Tube[[id number]]                          Final result                 Please view  results for these tests on the individual orders.    Hep B Confirmation Tube [[id number]] Collected:  01/15/20 0741    Specimen:  Blood Updated:  01/15/20 1946     Extra Tube Hold for add-ons.     Comment: Auto resulted.                Imaging Results (Last 24 Hours)     ** No results found for the last 24 hours. **             ECG/EMG Results (most recent)     Procedure Component Value Units Date/Time    ECG 12 Lead [[id number]] Collected:  01/15/20 0956     Updated:  01/15/20 1955    Narrative:       Test Reason : Baseline Cardiac Status  Blood Pressure : **/** mmHG  Vent. Rate : 083 BPM     Atrial Rate : 083 BPM     P-R Int : 138 ms          QRS Dur : 090 ms      QT Int : 354 ms       P-R-T Axes : 074 077 072 degrees     QTc Int : 415 ms    Normal sinus rhythm  Normal ECG  When compared with ECG of 15-FAVIOLA-2020 10:01, (Unconfirmed)  Sinus rhythm has replaced Ectopic atrial rhythm  Confirmed by Eris Quesada ([id number]) on 1/15/2020 7:55:39 PM    Referred By:  MARIA ELENA           Confirmed By:Eris Quesada           ALLERGIES: Penicillins and Triple antibiotic [bacitracin-neomycin-polymyxin]      Current Facility-Administered Medications:   •  acetaminophen (TYLENOL) tablet 650 mg, 650 mg, Oral, Q6H PRN, Jai John MD, 650 mg at 01/15/20 1659  •  aluminum-magnesium hydroxide-simethicone (MAALOX MAX) 400-400-40 MG/5ML suspension 15 mL, 15 mL, Oral, Q6H PRN, Jai John MD, 15 mL at 01/14/20 5486  •  benzonatate (TESSALON) capsule 100 mg, 100 mg, Oral, TID PRN, Jai oJhn MD  •  benztropine (COGENTIN) tablet 2 mg, 2 mg, Oral, Once PRN **OR** benztropine (COGENTIN) injection 1 mg, 1 mg, Intramuscular, Once PRN, Jai John MD  •  cyclobenzaprine (FLEXERIL) tablet 5 mg, 5 mg, Oral, TID PRN, Jai John MD, 5 mg at 01/15/20 1657  •  famotidine (PEPCID) tablet 20 mg, 20 mg, Oral, BID PRN, Jai John MD  •  hydrOXYzine (ATARAX) tablet 50 mg, 50 mg, Oral, Q6H PRN, Jai John MD, 50 mg at  01/15/20 2018  •  ibuprofen (ADVIL,MOTRIN) tablet 400 mg, 400 mg, Oral, Q6H PRN, Jai John MD, 400 mg at 01/16/20 1006  •  loperamide (IMODIUM) capsule 2 mg, 2 mg, Oral, Q2H PRN, Jai John MD, 2 mg at 01/16/20 1006  •  magnesium hydroxide (MILK OF MAGNESIA) suspension 2400 mg/10mL 10 mL, 10 mL, Oral, Daily PRN, Jai John MD  •  meloxicam (MOBIC) tablet 7.5 mg, 7.5 mg, Oral, Daily, Jai John MD, 7.5 mg at 01/16/20 0843  •  mirtazapine (REMERON) tablet 15 mg, 15 mg, Oral, Nightly, Luanne Sotomayor MD, 15 mg at 01/15/20 2018  •  mupirocin (BACTROBAN) 2 % ointment, , Topical, Q12H, Luanne Sotomayor MD  •  nicotine (NICODERM CQ) 14 MG/24HR patch 1 patch, 1 patch, Transdermal, Q24H, Luanne Sotomayor MD, 1 patch at 01/16/20 1100  •  ondansetron (ZOFRAN) tablet 4 mg, 4 mg, Oral, Q6H PRN, Jai John MD  •  pantoprazole (PROTONIX) EC tablet 40 mg, 40 mg, Oral, Daily, Jai John MD, 40 mg at 01/16/20 0844  •  risperiDONE (risperDAL) tablet 0.25 mg, 0.25 mg, Oral, Q12H, Luanne Sotomayor MD, 0.25 mg at 01/16/20 0844  •  sodium chloride nasal spray 2 spray, 2 spray, Each Nare, PRN, Jai John MD    ASSESSMENT & PLAN:      Acute depression with psychosis  - Continue Remeron  - Increase Risperdal        Opioid use disorder, severe, dependence (CMS/HCC)  - Pt reports she has not used any heroin since before Christmas but has been using meth.       Methamphetamine use disorder, severe (CMS/HCC)  - Supportive treatment       Hepatitis C  - Encourage patient to maintain sobriety and she will be able to get outpatient hep c treatment       Right elbow wound  - Mupirocin    Special precautions: Special Precautions Level 3 (q15 min checks) .    Behavioral Health Treatment Plan and Problem List: I have reviewed and approved the Behavioral Health Treatment Plan and Problem list.  The patient has had a chance to review and agrees with the treatment plan.     Clinician:  Luanne Sotomayor,  MD  01/16/20  11:10 AM

## 2020-01-16 NOTE — PLAN OF CARE
Problem: Patient Care Overview  Goal: Interprofessional Rounds/Family Conf  Flowsheets (Taken 1/16/2020 1820)  Participants: psychiatrist; social work; nursing; milieu/psych techs  Summary: Treatment team staffing.     Problem: Overarching Goals (Adult)  Goal: Optimized Coping Skills in Response to Life Stressors  Intervention: Promote Effective Coping Strategies  Flowsheets (Taken 1/16/2020 1820)  Supportive Measures: active listening utilized; counseling provided; verbalization of feelings encouraged; problem solving facilitated     Problem: Suicidal Behavior (Adult)  Intervention: Facilitate Resolution of Suicidal Intent  Flowsheets (Taken 1/16/2020 1820)  Mutually Determined Action Steps (Facilitate Resolution of Suicidal Intent): identifies crisis plan; identifies protective factors       DATA:         Therapist met individually with patient this date for inpatient follow up from initial assessment yesterday.  Continued to discuss progress with treatment.  Therapist reviewed patient's chart and provided education on resources for aftercare.  Discussed disposition planning.  Patient discussed feeling bad today with nausea and pain.  She reported feeling depressed and anxious.  She reported missing her family and appeared irritable.  Therapist attempted to engage patient in aftercare planning, however patient remains resistant and stated that counseling does not help her because everything is predestined.  She reported lacking motivation and feeling devoid of emotion.  Patient identified her only motivation for living to be her children and her family.  She discussed goal of setting strict boundaries with her mother who lives next door.  Patient continues stabilization.     Clinical Maneuvering/Intervention:     Therapist assisted patient in processing above session content; acknowledged and normalized patient’s thoughts, feelings, and concerns.  Discussed the therapist/patient relationship and explain the  parameters and limitations of relative confidentiality.  Also discussed the importance active participation, and honesty to the treatment process.  Encouraged the patient to discuss/vent her feelings, frustrations, and fears concerning ongoing medical issues and validated patient's feelings.     Discussed the importance of finding enjoyable activities and coping skills that the patient can engage in a regular basis. Discussed healthy coping skills such as distraction, self love, grounding, thought challenges/reframing, etc.  Provided patient with list of healthy coping skills this date. Discussed the importance of medication compliance.       Allowed patient to freely discuss issues without interruption or judgment. Provided safe, confidential environment to facilitate the development of positive therapeutic relationship and encourage open, honest communication.       ASSESSMENT:      Patient appeared to display restricted affect and depressed mood.  She denied active SI or plan.  She denied HI and AVH.  She reported feeling hopeless.  Patient oriented x3 with poor insight, poor judgement, and poor sleep.     PLAN:       Patient to remain hospitalized this date.  Patient refused to participate in aftercare planning.  Therapist anticipates [facility] referral.  Patient to return home with spouse upon discharge. Safety planning completed.

## 2020-01-16 NOTE — PLAN OF CARE
Problem: Patient Care Overview  Goal: Plan of Care Review  Outcome: Ongoing (interventions implemented as appropriate)  Flowsheets (Taken 1/16/2020 1620)  Progress: no change  Plan of Care Reviewed With: patient  Patient Agreement with Plan of Care: agrees  Note:   Rates anxiety 7 and depression 6. Denies si/hi. Slept most of the day.

## 2020-01-16 NOTE — PLAN OF CARE
"  Problem: Patient Care Overview  Goal: Plan of Care Review  Outcome: Ongoing (interventions implemented as appropriate)  Flowsheets (Taken 1/16/2020 0403)  Progress: improving  Plan of Care Reviewed With: patient  Patient Agreement with Plan of Care: agrees  Outcome Summary: Pt calm and cooperative. Rates anxiety 3 and depression 5. Denies SI or HI. Reports visual hallucinations reporting \"see movements.\" Reports sleep and appetite as good.     "

## 2020-01-17 VITALS
WEIGHT: 140.6 LBS | OXYGEN SATURATION: 98 % | HEART RATE: 95 BPM | TEMPERATURE: 97.7 F | HEIGHT: 72 IN | RESPIRATION RATE: 16 BRPM | SYSTOLIC BLOOD PRESSURE: 114 MMHG | BODY MASS INDEX: 19.05 KG/M2 | DIASTOLIC BLOOD PRESSURE: 71 MMHG

## 2020-01-17 PROCEDURE — 99238 HOSP IP/OBS DSCHRG MGMT 30/<: CPT | Performed by: PSYCHIATRY & NEUROLOGY

## 2020-01-17 RX ORDER — RISPERIDONE 0.5 MG/1
0.5 TABLET ORAL EVERY 12 HOURS SCHEDULED
Qty: 60 TABLET | Refills: 0 | Status: ON HOLD | OUTPATIENT
Start: 2020-01-17 | End: 2020-11-16

## 2020-01-17 RX ORDER — MIRTAZAPINE 15 MG/1
15 TABLET, FILM COATED ORAL NIGHTLY
Qty: 30 TABLET | Refills: 0 | Status: ON HOLD | OUTPATIENT
Start: 2020-01-17 | End: 2020-11-16

## 2020-01-17 RX ADMIN — PANTOPRAZOLE SODIUM 40 MG: 40 TABLET, DELAYED RELEASE ORAL at 08:44

## 2020-01-17 RX ADMIN — NICOTINE 1 PATCH: 14 PATCH TRANSDERMAL at 08:47

## 2020-01-17 RX ADMIN — MUPIROCIN: 20 OINTMENT TOPICAL at 08:47

## 2020-01-17 RX ADMIN — MELOXICAM 7.5 MG: 7.5 TABLET ORAL at 09:34

## 2020-01-17 RX ADMIN — RISPERIDONE 0.5 MG: 0.25 TABLET, FILM COATED ORAL at 08:45

## 2020-01-17 NOTE — PLAN OF CARE
Problem: Patient Care Overview  Goal: Plan of Care Review  Outcome: Ongoing (interventions implemented as appropriate)  Flowsheets  Taken 1/17/2020 0225  Progress: no change  Outcome Summary: Pt reports feeling frustrated and feels that her medication is not helping. Expresses wanting to discharge tomorrow. Rates anxiety 9/10 depression 6/10. Denies SI HI AVH.  Taken 1/16/2020 1930  Plan of Care Reviewed With: patient  Patient Agreement with Plan of Care: agrees

## 2020-01-17 NOTE — PLAN OF CARE
Problem: Patient Care Overview  Goal: Plan of Care Review  Outcome: Outcome(s) achieved  Flowsheets (Taken 1/17/2020 0855)  Progress: improving  Plan of Care Reviewed With: patient  Patient Agreement with Plan of Care: agrees  Note:   Ready for discharge.

## 2020-01-17 NOTE — DISCHARGE SUMMARY
"PSYCHIATRIC DISCHARGE SUMMARY     Patient Identification:  Name:  Laurel Fu  Age:  29 y.o.  Sex:  female  :  1990  MRN:  [id number]  Visit Number:  [id number]      Date of Admission:2020   Date of Discharge:  2020    Discharge Diagnosis:  Principal Problem:    Acute depression  Active Problems:    Opioid use disorder, severe, dependence (CMS/HCC)    Methamphetamine use disorder, severe (CMS/HCC)    Hepatitis C        Admission Diagnosis:  MDD (major depressive disorder) [F32.9]     Hospital Course  Patient is a 29 y.o. female presented with depression and psychosis and was verbalizing paranoid thoughts and suicidal ideations. The patient was also positive for methamphetamine and had a history of opioid use and her substance use appeared to be contributing to her presentation of paranoia. The patient was admitted to the [facility] AE1 unit and started on Remeron for depression and Risperdal for psychosis. She reported she had not used any opioids in more than three weeks and didn't need a detox but she admitted to using meth more recently. She continued to have paranoid thoughts and Risperdal dose was increased. The patient was finally able to get some sleep and her mental status improved and she was more coherent, and not paranoid and was reported her mood to be good and denied feeling hopeless or suicidal and wanted to go home.       Mental Status Exam upon discharge:   Mood \"positive\"   Affect-congruent, appropriate, stable  Thought Content-goal directed, no delusional material present  Thought process-linear, organized.  Suicidality: No SI  Homicidality: No HI  Perception: No AH/VH    Procedures Performed         Consults:   Consults     Date and Time Order Name Status Description    1/15/2020 5447 Inpatient Hospitalist Consult            Pertinent Test Results:   Lab Results (last 7 days)     Procedure Component Value Units Date/Time    Lipid Panel [[id number]]  (Abnormal) Collected:  " 01/16/20 0521    Specimen:  Blood Updated:  01/16/20 0600     Total Cholesterol 131 mg/dL      Triglycerides 133 mg/dL      HDL Cholesterol 38 mg/dL      LDL Cholesterol  66 mg/dL      VLDL Cholesterol 26.6 mg/dL      LDL/HDL Ratio 1.75    Narrative:       Cholesterol Reference Ranges  (U.S. Department of Health and Human Services ATP III Classifications)    Desirable          <200 mg/dL  Borderline High    200-239 mg/dL  High Risk          >240 mg/dL      Triglyceride Reference Ranges  (U.S. Department of Health and Human Services ATP III Classifications)    Normal           <150 mg/dL  Borderline High  150-199 mg/dL  High             200-499 mg/dL  Very High        >500 mg/dL    HDL Reference Ranges  (U.S. Department of Health and Human Services ATP III Classifcations)    Low     <40 mg/dl (major risk factor for CHD)  High    >60 mg/dl ('negative' risk factor for CHD)        LDL Reference Ranges  (U.S. Department of Health and Human Services ATP III Classifcations)    Optimal          <100 mg/dL  Near Optimal     100-129 mg/dL  Borderline High  130-159 mg/dL  High             160-189 mg/dL  Very High        >189 mg/dL    Vitamin B12 [[id number]]  (Normal) Collected:  01/15/20 1931    Specimen:  Blood Updated:  01/16/20 0248     Vitamin B-12 469 pg/mL     Narrative:       Results may be falsely increased if patient taking Biotin.      Folate [[id number]]  (Normal) Collected:  01/15/20 1931    Specimen:  Blood Updated:  01/16/20 0248     Folate 5.91 ng/mL     Narrative:       Results may be falsely increased if patient taking Biotin.      TSH [[id number]]  (Normal) Collected:  01/15/20 1931    Specimen:  Blood Updated:  01/15/20 2012     TSH 0.647 uIU/mL     C-reactive Protein [[id number]]  (Normal) Collected:  01/15/20 1931    Specimen:  Blood Updated:  01/15/20 2005     C-Reactive Protein 0.16 mg/dL     Hepatitis Panel, Acute [[id number]] Collected:  01/15/20 0741    Specimen:  Blood Updated:  01/15/20 1946     Narrative:       The following orders were created for panel order Hepatitis Panel, Acute.  Procedure                               Abnormality         Status                     ---------                               -----------         ------                     Hepatitis Panel, Acute[[id number]]       Abnormal            Final result               Hep B Confirmation Tube[[id number]]                          Final result                 Please view results for these tests on the individual orders.    Hep B Confirmation Tube [[id number]] Collected:  01/15/20 0741    Specimen:  Blood Updated:  01/15/20 1946     Extra Tube Hold for add-ons.     Comment: Auto resulted.       Hepatitis Panel, Acute [[id number]]  (Abnormal) Collected:  01/15/20 0741    Specimen:  Blood Updated:  01/15/20 0904     Hepatitis B Surface Ag Non-Reactive     Hep A IgM Non-Reactive     Hep B C IgM Non-Reactive     Hepatitis C Ab Reactive    Narrative:       Results may be falsely decreased if patient taking Biotin.     HIV-1 / O / 2 Ag / Antibody 4th Generation [[id number]]  (Normal) Collected:  01/15/20 0741    Specimen:  Blood Updated:  01/15/20 0833     HIV-1/ HIV-2 Non-Reactive     Comment: A non-reactive test result does not preclude the possibility of exposure to HIV or infection with HIV. An antibody response to recent exposure may take several months to reach detectable levels.       Narrative:       The HIV antibody/antigen combo assay is a qualitative assay for HIV that includes the p24 antigen as well as antibodies to HIV types 1 and 2. This test is intended to be used as a screening assay in the diagnosis of HIV infection in patients over the age of 2.  Results may be falsely decreased if patient taking Biotin.            Condition on Discharge:  improved    Vital Signs  Temp:  [98.3 °F (36.8 °C)-98.7 °F (37.1 °C)] 98.7 °F (37.1 °C)  Heart Rate:  [] 102  Resp:  [18] 18  BP: (112-116)/(59-83) 112/59      Discharge  Disposition:  Home or Self Care    Discharge Medications:     Discharge Medications      New Medications      Instructions Start Date   mirtazapine 15 MG tablet  Commonly known as:  REMERON   15 mg, Oral, Nightly      mupirocin 2 % ointment  Commonly known as:  BACTROBAN   Topical, Every 12 Hours Scheduled      risperiDONE 0.5 MG tablet  Commonly known as:  risperDAL   0.5 mg, Oral, Every 12 Hours Scheduled         Continue These Medications      Instructions Start Date   cyclobenzaprine 5 MG tablet  Commonly known as:  FLEXERIL   5 mg, Oral, 3 Times Daily PRN      meloxicam 7.5 MG tablet  Commonly known as:  MOBIC   7.5 mg, Oral, Daily      pantoprazole 40 MG EC tablet  Commonly known as:  PROTONIX   40 mg, Oral, Daily             Discharge Diet: Regular     Activity at Discharge: As tolerated    Follow-up Appointments    [facility] in [place]    Pt to follow-up with her PCP for treatment of Hep C.    Test Results Pending at Discharge      Clinician:   Luanne Sotomayor MD  01/17/20  7:59 AM

## 2020-01-17 NOTE — PROGRESS NOTES
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, spends all her time with her family, she report her  and mother are supportive, encouraged to engage in physical activity and outpatient behavioral health treatment    Crisis Safety Plan:  • Support System to utilize and contact numbers:  and mother-patient has contact numbers    • Educated on crisis hotline numbers (yes/no): Yes    • Was the Patient made aware of contact information for the following: community mental health centers, crisis stabilization programs, residential programs, , etc (yes/no): Yes    Will transportation be a barrier (yes/no): No  • If so, explain solution(s) to resolve barrier: N/A    • How and where will the patient obtain prescribed medications: patients medications were filled today by [facility] pharmacy and brought to patient.  The cost of the medications was covered by her insurance.    Assessment: Patient reports feeling much better today and ready for discharge.  Patient denies suicidal ideation and denies homicidal ideation.  Patient verbalizes the understanding of the importance of safety and aftercare.    Plan:    Discussed the importance of follow up treatment for continuity of care. The Patient was able to verbalize understanding and commitment to the individualized aftercare and crisis safety plan.      Delphine Rivas, [facility]

## 2020-11-16 ENCOUNTER — HOSPITAL ENCOUNTER (EMERGENCY)
Facility: HOSPITAL | Age: 30
Discharge: STILL ADMITTED OR ADMINISTRATIVE | End: 2020-11-16
Attending: FAMILY MEDICINE

## 2020-11-16 ENCOUNTER — HOSPITAL ENCOUNTER (INPATIENT)
Facility: HOSPITAL | Age: 30
LOS: 3 days | Discharge: HOME | End: 2020-11-19
Attending: PSYCHIATRY & NEUROLOGY | Admitting: PSYCHIATRY & NEUROLOGY

## 2020-11-16 VITALS
WEIGHT: 150 LBS | OXYGEN SATURATION: 99 % | TEMPERATURE: 98.4 F | HEART RATE: 76 BPM | SYSTOLIC BLOOD PRESSURE: 103 MMHG | BODY MASS INDEX: 20.32 KG/M2 | HEIGHT: 72 IN | DIASTOLIC BLOOD PRESSURE: 62 MMHG | RESPIRATION RATE: 16 BRPM

## 2020-11-16 DIAGNOSIS — R45.851 SUICIDAL THOUGHTS: Primary | ICD-10-CM

## 2020-11-16 PROBLEM — F29 PSYCHOSIS (HCC): Status: ACTIVE | Noted: 2020-11-16

## 2020-11-16 LAB
6-ACETYL MORPHINE: NEGATIVE
ALBUMIN SERPL-MCNC: 4.48 G/DL (ref 3.5–5.2)
ALBUMIN/GLOB SERPL: 1.7 G/DL
ALP SERPL-CCNC: 66 U/L (ref 39–117)
ALT SERPL W P-5'-P-CCNC: 30 U/L (ref 1–33)
AMPHET+METHAMPHET UR QL: POSITIVE
ANION GAP SERPL CALCULATED.3IONS-SCNC: 10.8 MMOL/L (ref 5–15)
AST SERPL-CCNC: 35 U/L (ref 1–32)
BARBITURATES UR QL SCN: NEGATIVE
BASOPHILS # BLD AUTO: 0.03 10*3/MM3 (ref 0–0.2)
BASOPHILS NFR BLD AUTO: 0.4 % (ref 0–1.5)
BENZODIAZ UR QL SCN: NEGATIVE
BILIRUB SERPL-MCNC: 0.7 MG/DL (ref 0–1.2)
BILIRUB UR QL STRIP: NEGATIVE
BUN SERPL-MCNC: 9 MG/DL (ref 6–20)
BUN/CREAT SERPL: 14.8 (ref 7–25)
BUPRENORPHINE SERPL-MCNC: NEGATIVE NG/ML
CALCIUM SPEC-SCNC: 9.3 MG/DL (ref 8.6–10.5)
CANNABINOIDS SERPL QL: NEGATIVE
CHLORIDE SERPL-SCNC: 105 MMOL/L (ref 98–107)
CLARITY UR: ABNORMAL
CO2 SERPL-SCNC: 23.2 MMOL/L (ref 22–29)
COCAINE UR QL: NEGATIVE
COLOR UR: YELLOW
CREAT SERPL-MCNC: 0.61 MG/DL (ref 0.57–1)
DEPRECATED RDW RBC AUTO: 40.5 FL (ref 37–54)
EOSINOPHIL # BLD AUTO: 0.05 10*3/MM3 (ref 0–0.4)
EOSINOPHIL NFR BLD AUTO: 0.6 % (ref 0.3–6.2)
ERYTHROCYTE [DISTWIDTH] IN BLOOD BY AUTOMATED COUNT: 12.2 % (ref 12.3–15.4)
ETHANOL BLD-MCNC: <10 MG/DL (ref 0–10)
ETHANOL UR QL: <0.01 %
GFR SERPL CREATININE-BSD FRML MDRD: 114 ML/MIN/1.73
GLOBULIN UR ELPH-MCNC: 2.6 GM/DL
GLUCOSE SERPL-MCNC: 82 MG/DL (ref 65–99)
GLUCOSE UR STRIP-MCNC: NEGATIVE MG/DL
HCT VFR BLD AUTO: 44.1 % (ref 34–46.6)
HGB BLD-MCNC: 14.8 G/DL (ref 12–15.9)
HGB UR QL STRIP.AUTO: NEGATIVE
IMM GRANULOCYTES # BLD AUTO: 0.02 10*3/MM3 (ref 0–0.05)
IMM GRANULOCYTES NFR BLD AUTO: 0.3 % (ref 0–0.5)
KETONES UR QL STRIP: NEGATIVE
LEUKOCYTE ESTERASE UR QL STRIP.AUTO: NEGATIVE
LYMPHOCYTES # BLD AUTO: 2.39 10*3/MM3 (ref 0.7–3.1)
LYMPHOCYTES NFR BLD AUTO: 30.6 % (ref 19.6–45.3)
MAGNESIUM SERPL-MCNC: 2.1 MG/DL (ref 1.6–2.6)
MCH RBC QN AUTO: 30.5 PG (ref 26.6–33)
MCHC RBC AUTO-ENTMCNC: 33.6 G/DL (ref 31.5–35.7)
MCV RBC AUTO: 90.9 FL (ref 79–97)
METHADONE UR QL SCN: NEGATIVE
MONOCYTES # BLD AUTO: 0.47 10*3/MM3 (ref 0.1–0.9)
MONOCYTES NFR BLD AUTO: 6 % (ref 5–12)
NEUTROPHILS NFR BLD AUTO: 4.86 10*3/MM3 (ref 1.7–7)
NEUTROPHILS NFR BLD AUTO: 62.1 % (ref 42.7–76)
NITRITE UR QL STRIP: NEGATIVE
NRBC BLD AUTO-RTO: 0 /100 WBC (ref 0–0.2)
OPIATES UR QL: NEGATIVE
OXYCODONE UR QL SCN: NEGATIVE
PCP UR QL SCN: NEGATIVE
PH UR STRIP.AUTO: 6.5 [PH] (ref 5–8)
PLATELET # BLD AUTO: 391 10*3/MM3 (ref 140–450)
PMV BLD AUTO: 8.8 FL (ref 6–12)
POTASSIUM SERPL-SCNC: 4 MMOL/L (ref 3.5–5.2)
PROT SERPL-MCNC: 7.1 G/DL (ref 6–8.5)
PROT UR QL STRIP: NEGATIVE
QT INTERVAL: 382 MS
QTC INTERVAL: 415 MS
RBC # BLD AUTO: 4.85 10*6/MM3 (ref 3.77–5.28)
SARS-COV-2 RNA RESP QL NAA+PROBE: NOT DETECTED
SODIUM SERPL-SCNC: 139 MMOL/L (ref 136–145)
SP GR UR STRIP: 1.02 (ref 1–1.03)
UROBILINOGEN UR QL STRIP: ABNORMAL
WBC # BLD AUTO: 7.82 10*3/MM3 (ref 3.4–10.8)

## 2020-11-16 PROCEDURE — 99284 EMERGENCY DEPT VISIT MOD MDM: CPT

## 2020-11-16 PROCEDURE — 80307 DRUG TEST PRSMV CHEM ANLYZR: CPT | Performed by: FAMILY MEDICINE

## 2020-11-16 PROCEDURE — 25010000002 HALOPERIDOL LACTATE PER 5 MG: Performed by: PSYCHIATRY & NEUROLOGY

## 2020-11-16 PROCEDURE — 80053 COMPREHEN METABOLIC PANEL: CPT | Performed by: FAMILY MEDICINE

## 2020-11-16 PROCEDURE — 87635 SARS-COV-2 COVID-19 AMP PRB: CPT | Performed by: FAMILY MEDICINE

## 2020-11-16 PROCEDURE — 93005 ELECTROCARDIOGRAM TRACING: CPT | Performed by: PSYCHIATRY & NEUROLOGY

## 2020-11-16 PROCEDURE — 81003 URINALYSIS AUTO W/O SCOPE: CPT | Performed by: FAMILY MEDICINE

## 2020-11-16 PROCEDURE — 85025 COMPLETE CBC W/AUTO DIFF WBC: CPT | Performed by: FAMILY MEDICINE

## 2020-11-16 PROCEDURE — 25010000002 LORAZEPAM PER 2 MG: Performed by: PSYCHIATRY & NEUROLOGY

## 2020-11-16 PROCEDURE — 25010000002 DIPHENHYDRAMINE PER 50 MG: Performed by: PSYCHIATRY & NEUROLOGY

## 2020-11-16 PROCEDURE — 83735 ASSAY OF MAGNESIUM: CPT | Performed by: FAMILY MEDICINE

## 2020-11-16 PROCEDURE — 93010 ELECTROCARDIOGRAM REPORT: CPT | Performed by: INTERNAL MEDICINE

## 2020-11-16 RX ORDER — LOPERAMIDE HYDROCHLORIDE 2 MG/1
2 CAPSULE ORAL
Status: DISCONTINUED | OUTPATIENT
Start: 2020-11-16 | End: 2020-11-19 | Stop reason: HOSPADM

## 2020-11-16 RX ORDER — TRAZODONE HYDROCHLORIDE 50 MG/1
50 TABLET ORAL NIGHTLY PRN
Status: DISCONTINUED | OUTPATIENT
Start: 2020-11-16 | End: 2020-11-17

## 2020-11-16 RX ORDER — BENZTROPINE MESYLATE 1 MG/1
2 TABLET ORAL ONCE AS NEEDED
Status: DISCONTINUED | OUTPATIENT
Start: 2020-11-16 | End: 2020-11-19 | Stop reason: HOSPADM

## 2020-11-16 RX ORDER — IBUPROFEN 400 MG/1
400 TABLET ORAL EVERY 6 HOURS PRN
Status: DISCONTINUED | OUTPATIENT
Start: 2020-11-16 | End: 2020-11-19 | Stop reason: HOSPADM

## 2020-11-16 RX ORDER — ONDANSETRON 4 MG/1
4 TABLET, FILM COATED ORAL EVERY 6 HOURS PRN
Status: DISCONTINUED | OUTPATIENT
Start: 2020-11-16 | End: 2020-11-19 | Stop reason: HOSPADM

## 2020-11-16 RX ORDER — NICOTINE 21 MG/24HR
1 PATCH, TRANSDERMAL 24 HOURS TRANSDERMAL
Status: DISCONTINUED | OUTPATIENT
Start: 2020-11-16 | End: 2020-11-19 | Stop reason: HOSPADM

## 2020-11-16 RX ORDER — BENZONATATE 100 MG/1
100 CAPSULE ORAL 3 TIMES DAILY PRN
Status: DISCONTINUED | OUTPATIENT
Start: 2020-11-16 | End: 2020-11-19 | Stop reason: HOSPADM

## 2020-11-16 RX ORDER — BENZTROPINE MESYLATE 1 MG/ML
1 INJECTION INTRAMUSCULAR; INTRAVENOUS ONCE AS NEEDED
Status: DISCONTINUED | OUTPATIENT
Start: 2020-11-16 | End: 2020-11-19 | Stop reason: HOSPADM

## 2020-11-16 RX ORDER — ALUMINA, MAGNESIA, AND SIMETHICONE 2400; 2400; 240 MG/30ML; MG/30ML; MG/30ML
15 SUSPENSION ORAL EVERY 6 HOURS PRN
Status: DISCONTINUED | OUTPATIENT
Start: 2020-11-16 | End: 2020-11-19 | Stop reason: HOSPADM

## 2020-11-16 RX ORDER — DIPHENHYDRAMINE HYDROCHLORIDE 50 MG/ML
50 INJECTION INTRAMUSCULAR; INTRAVENOUS EVERY 6 HOURS PRN
Status: DISCONTINUED | OUTPATIENT
Start: 2020-11-16 | End: 2020-11-19 | Stop reason: HOSPADM

## 2020-11-16 RX ORDER — HALOPERIDOL 5 MG/ML
5 INJECTION INTRAMUSCULAR EVERY 6 HOURS PRN
Status: DISCONTINUED | OUTPATIENT
Start: 2020-11-16 | End: 2020-11-19 | Stop reason: HOSPADM

## 2020-11-16 RX ORDER — LORAZEPAM 2 MG/ML
2 INJECTION INTRAMUSCULAR EVERY 6 HOURS PRN
Status: DISCONTINUED | OUTPATIENT
Start: 2020-11-16 | End: 2020-11-19 | Stop reason: HOSPADM

## 2020-11-16 RX ORDER — ECHINACEA PURPUREA EXTRACT 125 MG
2 TABLET ORAL AS NEEDED
Status: DISCONTINUED | OUTPATIENT
Start: 2020-11-16 | End: 2020-11-19 | Stop reason: HOSPADM

## 2020-11-16 RX ORDER — FAMOTIDINE 20 MG/1
20 TABLET, FILM COATED ORAL 2 TIMES DAILY PRN
Status: DISCONTINUED | OUTPATIENT
Start: 2020-11-16 | End: 2020-11-19 | Stop reason: HOSPADM

## 2020-11-16 RX ORDER — HYDROXYZINE 50 MG/1
50 TABLET, FILM COATED ORAL EVERY 6 HOURS PRN
Status: DISCONTINUED | OUTPATIENT
Start: 2020-11-16 | End: 2020-11-19 | Stop reason: HOSPADM

## 2020-11-16 RX ADMIN — LORAZEPAM 2 MG: 2 INJECTION INTRAMUSCULAR; INTRAVENOUS at 18:30

## 2020-11-16 RX ADMIN — Medication 1 PATCH: at 15:46

## 2020-11-16 RX ADMIN — IBUPROFEN 400 MG: 400 TABLET, FILM COATED ORAL at 18:13

## 2020-11-16 RX ADMIN — HALOPERIDOL LACTATE 5 MG: 5 INJECTION, SOLUTION INTRAMUSCULAR at 18:31

## 2020-11-16 RX ADMIN — DIPHENHYDRAMINE HYDROCHLORIDE 50 MG: 50 INJECTION, SOLUTION INTRAMUSCULAR; INTRAVENOUS at 18:30

## 2020-11-16 NOTE — NURSING NOTE
"Presented to ED reporting suicidal thoughts.  States that she has auditory hallucinations, and the voices tell her all day long to kill herself.  She was last admitted here in January.  Reports that she was also admitted to [facility] in February.  Has not received any follow up care since those admissions.  States that she was clean until June, but when she attempted to get help, social workers were contacted and attempted to remove her children.  However, she states that she children are/and have been in the care of her mother and their father.  States \"Then I was clean until about 2 months ago except for heroin here and there to help me sleep.  It is the only thing that helps.  I haven't slept in about 4 months and they won't give me xanax or anything like that.\"  Reports she thinks she \"got something bad\" last week.  Reported to intake RN that she has been dealing with the antichrist, demons, and the devil.    "

## 2020-11-16 NOTE — NURSING NOTE
"PATIENT SUMMONED STAFF TO HER ROOM AND POINTED OUT BRUISES TO HER BILATERAL THIGHS. PATIENT WOULD NOT SAY HOW SHE CAME TO HAVE BRUISES. PATIENT REQUESTED  \"SOMETHING FOR PAIN.\"  PATIENT WAS INSTRUCTED TO SEE THE MEDICATION NURSE AND REQUEST  WHATEVER PRN MEDICATIONS THAT ARE PRESCRIBED FOR HER TO HELP WITH PAIN.    "

## 2020-11-16 NOTE — NURSING NOTE
"Patient very agitated. Cursing at staff, \"I know you all just think I'm a drug addict\" \"But I need to fucking sleep or I'm gonna go off\". Ativan 2mg Haldol 5 mg given rgm. Benadryl 50 mg given lgm.  "

## 2020-11-16 NOTE — NURSING NOTE
Called and provided intake information including all abnormal  labs to Dr Bautista admit orders received.RBVOx2. Patient and ED provider aware.

## 2020-11-16 NOTE — NURSING NOTE
1815 PATIENT BECAME VERY AGITATED, SCREAMING AND CURSING AT STAFF. PATIENT ANGRY THAT SHE DOES NOT HAVE THE MEDICATIONS AVAILABLE THAT SHE WANTS.  PATIENT WENT TO ROOM ANGRY AND AGITATED. 1820 OFFERED PATIENT PRN MEDICATIONS FOR AGITATION AND ANXIETY. PATIENT AGREED  TO TAKE THEM. CAUTIONED PATIENT THAT HER BEHAVIOR WAS  UNACCEPTABLE AND WOULD NOT BE TOLERATED. PATIENT AGREED THAT SHE WOULD ATTEMPT TO REMAIN CALM IN THE FUTURE.  1825 PATIENT RECEIVED PRN MEDICATIONS.
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
## 2020-11-16 NOTE — ED PROVIDER NOTES
Subjective     History provided by:  Patient   used: No    Mental Health Problem  Presenting symptoms: agitation, depression, suicidal thoughts and suicide attempt    Presenting symptoms: no hallucinations    Degree of incapacity (severity):  Moderate  Onset quality:  Gradual  Duration:  2 days  Timing:  Intermittent  Progression:  Worsening  Chronicity:  New  Context: not alcohol use, not drug abuse, not medication and not recent medication change    Treatment compliance:  Untreated  Time since last psychoactive medication taken:  2 days  Relieved by:  Nothing  Worsened by:  Nothing  Ineffective treatments:  None tried  Associated symptoms: no abdominal pain, no anhedonia, no anxiety, no appetite change, no chest pain, no headaches, no hypersomnia, no hyperventilation, no insomnia and no school problems    Risk factors: no family violence, no hx of suicide attempts, no neurological disease and no recent psychiatric admission        Review of Systems   Constitutional: Negative.  Negative for appetite change.   Respiratory: Negative.  Negative for cough and choking.    Cardiovascular: Negative.  Negative for chest pain and leg swelling.   Gastrointestinal: Negative.  Negative for abdominal pain.   Genitourinary: Negative.  Negative for dysuria, flank pain, frequency and genital sores.   Musculoskeletal: Negative.  Negative for back pain, gait problem and myalgias.   Skin: Negative.  Negative for color change, pallor and rash.   Neurological: Negative for headaches.   Psychiatric/Behavioral: Positive for agitation, behavioral problems and suicidal ideas. Negative for decreased concentration, dysphoric mood and hallucinations. The patient is not nervous/anxious and does not have insomnia.    All other systems reviewed and are negative.      Past Medical History:   Diagnosis Date   • Depression    • Obsessive-compulsive disorder    • Psychosis (CMS/HCC)     visual pretty much all the time   • PTSD  (post-traumatic stress disorder)    • Seizures (CMS/[facility])     states when she stopped opiates a long time ago       Allergies   Allergen Reactions   • Penicillins Hives   • Triple Antibiotic [Bacitracin-Neomycin-Polymyxin] Hives       Past Surgical History:   Procedure Laterality Date   • APPENDECTOMY  2008   • HYSTERECTOMY  2017   • LAPAROSCOPIC TUBAL LIGATION  2013       Family History   Problem Relation Age of Onset   • Depression Mother    • Anxiety disorder Mother    • Bipolar disorder Mother    • Alcohol abuse Maternal Grandmother    • Schizophrenia Maternal Grandmother        Social History     Socioeconomic History   • Marital status:      Spouse name: Not on file   • Number of children: Not on file   • Years of education: Not on file   • Highest education level: Not on file   Tobacco Use   • Smoking status: Current Every Day Smoker     Packs/day: 1.00     Years: 17.00     Pack years: 17.00     Types: Cigarettes   • Smokeless tobacco: Never Used   Substance and Sexual Activity   • Alcohol use: Not Currently   • Drug use: Yes     Types: Heroin, Methamphetamines   • Sexual activity: Yes     Partners: Male           Objective   Physical Exam  Vitals signs and nursing note reviewed.   Constitutional:       General: She is not in acute distress.     Appearance: Normal appearance. She is normal weight. She is not ill-appearing, toxic-appearing or diaphoretic.   HENT:      Head: Normocephalic and atraumatic.      Right Ear: Tympanic membrane, ear canal and external ear normal. There is no impacted cerumen.      Left Ear: Tympanic membrane, ear canal and external ear normal. There is no impacted cerumen.      Nose: Nose normal. No congestion or rhinorrhea.      Mouth/Throat:      Mouth: Mucous membranes are moist.      Pharynx: Oropharynx is clear. No oropharyngeal exudate or posterior oropharyngeal erythema.   Eyes:      General: No scleral icterus.        Right eye: No discharge.         Left eye: No  discharge.      Extraocular Movements: Extraocular movements intact.      Conjunctiva/sclera: Conjunctivae normal.      Pupils: Pupils are equal, round, and reactive to light.   Neck:      Musculoskeletal: Normal range of motion and neck supple. No neck rigidity or muscular tenderness.      Vascular: No carotid bruit.   Cardiovascular:      Rate and Rhythm: Normal rate and regular rhythm.      Pulses: Normal pulses.      Heart sounds: Normal heart sounds. No murmur. No friction rub. No gallop.    Pulmonary:      Effort: Pulmonary effort is normal. No respiratory distress.      Breath sounds: Normal breath sounds. No stridor. No wheezing, rhonchi or rales.   Chest:      Chest wall: No tenderness.   Abdominal:      General: Abdomen is flat. Bowel sounds are normal. There is no distension.      Palpations: Abdomen is soft. There is no mass.      Tenderness: There is no abdominal tenderness. There is no right CVA tenderness, left CVA tenderness, guarding or rebound.      Hernia: No hernia is present.   Musculoskeletal: Normal range of motion.         General: No swelling, tenderness, deformity or signs of injury.      Right lower leg: No edema.      Left lower leg: No edema.   Lymphadenopathy:      Cervical: No cervical adenopathy.   Skin:     General: Skin is warm and dry.      Coloration: Skin is not jaundiced or pale.      Findings: No bruising, erythema, lesion or rash.   Neurological:      General: No focal deficit present.      Mental Status: She is alert and oriented to person, place, and time. Mental status is at baseline.      Cranial Nerves: No cranial nerve deficit.      Sensory: No sensory deficit.      Motor: No weakness.      Coordination: Coordination normal.      Gait: Gait normal.      Deep Tendon Reflexes: Reflexes normal.   Psychiatric:         Mood and Affect: Mood is anxious.         Behavior: Behavior is aggressive.         Thought Content: Thought content includes suicidal ideation. Thought  content includes suicidal plan.         Judgment: Judgment normal.         Procedures           ED Course                                           MDM    Final diagnoses:   Suicidal thoughts            Osbaldo Lord PA-C  11/16/20 [id number]

## 2020-11-17 PROCEDURE — 99223 1ST HOSP IP/OBS HIGH 75: CPT | Performed by: PSYCHIATRY & NEUROLOGY

## 2020-11-17 RX ORDER — DOXEPIN HYDROCHLORIDE 25 MG/1
50 CAPSULE ORAL NIGHTLY
Status: DISCONTINUED | OUTPATIENT
Start: 2020-11-17 | End: 2020-11-19 | Stop reason: HOSPADM

## 2020-11-17 RX ORDER — ARIPIPRAZOLE 10 MG/1
5 TABLET ORAL DAILY
Status: DISCONTINUED | OUTPATIENT
Start: 2020-11-17 | End: 2020-11-19 | Stop reason: HOSPADM

## 2020-11-17 RX ADMIN — DOXEPIN HYDROCHLORIDE 50 MG: 25 CAPSULE ORAL at 20:52

## 2020-11-17 RX ADMIN — ARIPIPRAZOLE 5 MG: 10 TABLET ORAL at 13:57

## 2020-11-17 RX ADMIN — Medication 1 PATCH: at 12:01

## 2020-11-17 NOTE — PLAN OF CARE
Goal Outcome Evaluation:  Plan of Care Reviewed With: patient  Progress: declining  Outcome Summary: Pt reports to have anxiety and depression. Continues to have SI with no plan. Pt is lethargic but restless. Order for a private room was placed due to patient psychotic behaviors and she kept stripping her clothes off. Appears to be resting at this time.   Patent

## 2020-11-17 NOTE — H&P
INITIAL PSYCHIATRIC HISTORY & PHYSICAL    Patient Identification:  Name:  Laurel Fu  Age:  30 y.o.  Sex:  female  :  1990  MRN:  [id number]   Visit Number:  [id number]  Primary Care Physician:  Provider, No Known    SUBJECTIVE    CC/Focus of Exam: Psychosis    HPI: Laurel Fu is a 30 y.o. female who was admitted on 2020 with complaints of psychosis and bizarre thoughts and behaviors. She reported in the ED that she was fighting antichrist for the last four weeks. She reported that demons are inside her head and tell her to kill herself. She states she has not been sleeping good for past many days. States she was doing good without drugs for about six months but relapsed on meth and heroin about a month ago. She states she has not used any in 3-4 days, and was taking meth more than heroin.    PAST PSYCHIATRIC HX: The patient has one previous inpatient psych admission in 2020 for acute depression, paranoid apparently caused by her substance use, primarily methamphetamine. She didn't do any outpatient follow-up.    SUBSTANCE USE HX: The patient admits to meth and heroin use is positive for methamphetamine, and has used meth along with opioids in the past.    SOCIAL HX:   Social History     Socioeconomic History   • Marital status:      Spouse name: Not on file   • Number of children: Not on file   • Years of education: Not on file   • Highest education level: Not on file   Tobacco Use   • Smoking status: Current Every Day Smoker     Packs/day: 1.00     Years: 17.00     Pack years: 17.00     Types: Cigarettes   • Smokeless tobacco: Never Used   Substance and Sexual Activity   • Alcohol use: Not Currently   • Drug use: Yes     Types: Heroin, Methamphetamines   • Sexual activity: Yes     Partners: Male         Past Medical History:   Diagnosis Date   • Depression    • Hepatitis C    • Obsessive-compulsive disorder    • Psychosis (CMS/HCC)     visual pretty much all the time   •  PTSD (post-traumatic stress disorder)    • Seizures (CMS/HCC)     states when she stopped opiates a long time ago          Past Surgical History:   Procedure Laterality Date   • APPENDECTOMY  2008   • HYSTERECTOMY  2017   • LAPAROSCOPIC TUBAL LIGATION  2013       Family History   Problem Relation Age of Onset   • Depression Mother    • Anxiety disorder Mother    • Bipolar disorder Mother    • Alcohol abuse Maternal Grandmother    • Schizophrenia Maternal Grandmother          No medications prior to admission.         ALLERGIES:  Penicillins and Triple antibiotic [bacitracin-neomycin-polymyxin]    Temp:  [97.4 °F (36.3 °C)-97.8 °F (36.6 °C)] 97.8 °F (36.6 °C)  Heart Rate:  [76-85] 83  Resp:  [18] 18  BP: (103-141)/(62-93) 141/93    REVIEW OF SYSTEMS:  Review of Systems   Constitutional: Positive for fatigue.   HENT: Negative.    Eyes: Negative.    Respiratory: Negative.    Cardiovascular: Negative.    Gastrointestinal: Negative.    Endocrine: Negative.    Genitourinary: Negative.    Musculoskeletal: Negative.    Skin: Negative.    Allergic/Immunologic: Negative.    Neurological: Negative.    Hematological: Negative.    Psychiatric/Behavioral: Positive for confusion, dysphoric mood and suicidal ideas. The patient is nervous/anxious.         OBJECTIVE    PHYSICAL EXAM:  Physical Exam  Constitutional:  Appears well-developed and well-nourished.   HENT:   Head: Normocephalic and atraumatic.   Right Ear: External ear normal.   Left Ear: External ear normal.   Mouth/Throat: Oropharynx is clear and moist.   Eyes: Pupils are equal, round, and reactive to light. Conjunctivae and EOM are normal.   Neck: Normal range of motion. Neck supple.   Cardiovascular: Normal rate, regular rhythm and normal heart sounds.    Pulmonary/Chest: Effort normal and breath sounds normal. No respiratory distress. No wheezes.   Abdominal: Soft. Bowel sounds are normal.No distension. There is no tenderness.   Musculoskeletal: Normal range of motion.  No edema or deformity.   Neurological:No cranial nerve deficit. Coordination normal.   Skin: Skin is warm and dry. No rash noted. No erythema.       MENTAL STATUS EXAM:    Hygiene:   fair  Cooperation:  Cooperative  Eye Contact:  Fair  Psychomotor Behavior:  Slow  Affect:  Restricted  Hopelessness: Denies  Speech:  Normal  Thought Progress:  Goal directed  Thought Content:  Bizarre  Suicidal:  Suicidal Ideation  Homicidal:  None  Hallucinations:  Auditory  Delusion:  Paranoid  Memory:  Deficits  Orientation:  Person, Place, Time and Situation  Reliability:  fair  Insight:  Poor  Judgement:  Poor  Impulse Control:  Fair      Imaging Results (Last 24 Hours)     ** No results found for the last 24 hours. **           ECG/EMG Results (most recent)     Procedure Component Value Units Date/Time    ECG 12 Lead [[id number]] Collected: 11/16/20 1715     Updated: 11/16/20 1849     QT Interval 382 ms      QTC Interval 415 ms     Narrative:      Test Reason : Baseline Cardiac Status  Blood Pressure : **/** mmHG  Vent. Rate : 071 BPM     Atrial Rate : 071 BPM     P-R Int : 140 ms          QRS Dur : 086 ms      QT Int : 382 ms       P-R-T Axes : 062 061 056 degrees     QTc Int : 415 ms    Normal sinus rhythm  Normal ECG  When compared with ECG of 15-FAVIOLA-2020 11:40,  No significant change was found  Confirmed by Edwin Esquivel (2020) on 11/16/2020 6:49:10 PM    Referred By:             Confirmed By:Edwin Esquivel           Lab Results   Component Value Date    GLUCOSE 82 11/16/2020    BUN 9 11/16/2020    CREATININE 0.61 11/16/2020    EGFRIFNONA 114 11/16/2020    BCR 14.8 11/16/2020    CO2 23.2 11/16/2020    CALCIUM 9.3 11/16/2020    ALBUMIN 4.48 11/16/2020    AST 35 (H) 11/16/2020    ALT 30 11/16/2020       Lab Results   Component Value Date    WBC 7.82 11/16/2020    HGB 14.8 11/16/2020    HCT 44.1 11/16/2020    MCV 90.9 11/16/2020     11/16/2020       Last Urine Toxicity     LAST URINE TOXICITY RESULTS Latest  Ref Rng & Units 11/16/2020 1/14/2020    AMPHETAMINES SCREEN, URINE Negative Positive(A) Positive(A)    BARBITURATES SCREEN Negative Negative Negative    BENZODIAZEPINE SCREEN, URINE Negative Negative Negative    BUPRENORPHINEUR Negative Negative Negative    COCAINE SCREEN, URINE Negative Negative Negative    METHADONE SCREEN, URINE Negative Negative Negative          Brief Urine Lab Results  (Last result in the past 365 days)      Color   Clarity   Blood   Leuk Est   Nitrite   Protein   CREAT   Urine HCG        11/16/20 1257 Yellow Cloudy Negative Negative Negative Negative               DATA  Labs reviewed. AST 35, rest of CMP is normal. CBC is grossly normal. UA normal. UDS positive for amphetamine.  EKG reviewed. QTc 415.   DHRUV reviewed. No active controlled rx.  Record reviewed. The patient was admitted to the adult psych unit earlier this year and was depressed and paranoid precipitated by her methamphetamine use and her mood improved once she was able to get some sleep.    Strengths: Literate    Weaknesses:Substance use and Poor coping skills    Code status:  Full  Discussed code status with patient.    ASSESSMENT & PLAN:        Psychosis (CMS/HCC)  - Appears to be related to methamphetamine use. Will treat symptomatically.  - Start aripiprazole and doxepin      Opioid use disorder, severe, dependence (CMS/HCC)  - Patient reports no use in about a week. Monitor for withdrawals.      Methamphetamine use disorder, severe (CMS/HCC)  - Supportive treatment      Hepatitis C  - Patient is aware of it, but has not been treated for it. She is encouraged to maintain sobriety and seek outpatient care for it.        The patient has been admitted for safety and stabilization.  Patient will be monitored for suicidality daily and maintained on Special Precautions Level 3 (q15 min checks) .  The patient will have individual and group therapy with a master's level therapist. A master treatment plan will be developed and  agreed upon by the patient and his/her treatment team.  The patient's estimated length of stay in the hospital is 5-7 days.

## 2020-11-17 NOTE — DISCHARGE INSTR - APPOINTMENTS
[facility]  [address]  [place] [address]   [phone]    12/15/2020 at 2:30pm with Dr High   Please bring insurance card and photo id.   Please fax discharge summary to:  [phone]      [facility]  [address] [place] [id number] [phone]    November 20, 2020 at 4:00Pm with Aracely Ritchie. The office will call you at this time. You will not need to present to the office.

## 2020-11-17 NOTE — PLAN OF CARE
Problem: Adult Behavioral Health Plan of Care  Goal: Plan of Care Review  Outcome: Ongoing, Progressing  Flowsheets  Taken 11/17/2020 1432  Consent Given to Review Plan with: PATIENT VERBALIZES ANXIETY, DEPRESSION AS ONLY PROBLEMS THIS SHIFT. PATIENT REMAINS IRRITABLE. PATIENT REQUESTED TO LEAVE AMA BUT MD DENIES THIS REQUEST. PATIENT IS AOX3 WITH NO ACUTE S/S OF DISTRESS NOTED. NEW ORDERS: DOXEPIN 50, ABILIFY 5MG, D/C TRAZODONE.  Progress: improving  Taken 11/17/2020 0844  Plan of Care Reviewed With: patient  Patient Agreement with Plan of Care: agrees   Goal Outcome Evaluation:  Plan of Care Reviewed With: patient  Progress: improving

## 2020-11-17 NOTE — PLAN OF CARE
Problem: Adult Behavioral Health Plan of Care  Goal: Plan of Care Review  Outcome: Ongoing, Progressing  Flowsheets  Taken 11/17/2020 1432 by Igor Stuart, RN  Progress: improving  Taken 11/17/2020 0844 by Igor Stuart, RN  Plan of Care Reviewed With: patient  Patient Agreement with Plan of Care: agrees  Taken 11/17/2020 0429 by Maylin Escobar RN  Outcome Summary: Pt reports to have anxiety and depression. Continues to have SI with no plan. Pt is lethargic but restless. Order for a private room was placed due to patient psychotic behaviors and she kept stripping her clothes off. Appears to be resting at this time.     Problem: Adult Behavioral Health Plan of Care  Goal: Patient-Specific Goal (Individualization)  Outcome: Ongoing, Progressing  Flowsheets  Taken 11/17/2020 1537 by Renée Walker  Patient-Specific Goals (Include Timeframe): Mood stabilization, individual and group therapy to focus on healthy coping skills, relapse prevention, safe discharge planning and appropriate follow-up care.  Taken 11/17/2020 1526 by Renée Walker  Patient Personal Strengths:   expressive of emotions   expressive of needs   family/social support   stable living environment   motivated for treatment   motivated for recovery   positive attitude   insight into illness/situation  Patient Vulnerabilities:   adverse childhood experience(s)   substance abuse/addiction  Taken 11/16/2020 1555 by Imelda Patel, RN  Individualized Care Needs: None verbalized  Anxieties, Fears or Concerns: None verbalized     Problem: Adult Behavioral Health Plan of Care  Goal: Develops/Participates in Therapeutic [place] to Support Successful Transition  Outcome: Ongoing, Progressing     Problem: Adult Behavioral Health Plan of Care  Goal: Develops/Participates in Therapeutic [place] to Support Successful Transition  Intervention: Mutually Develop Transition Plan  Recent Flowsheet Documentation  Taken 11/17/2020 1536 by Aaron  Renée Alvarez  Transportation Anticipated: car, drives self  Current Discharge Risk:   psychiatric illness   substance use/abuse   lives alone  Concerns to be Addressed:   substance/tobacco abuse/use   suicidal   medication   mental health  Readmission Within the Last 30 Days: no previous admission in last 30 days  Patient/Family Anticipated Services at Transition: mental health services  Patient's Choice of Community Agency(s): [facility] in [place], consent obtained  Patient/Family Anticipates Transition to: home  Offered/Gave Vendor List: yes       DATA:    Therapist met individually with patient this date for initial evaluation.  Introduced self as Therapist and the role of a positive therapeutic relationship; patient agreeable.  Therapist encouraged patient to speak openly and honestly about any issues or stressors during treatment stay. Therapist explained how open communication is significant to providing most effective care.       Therapist completed psychosocial assessment, integrated summary, reviewed care plans, disposition planning and discussed hospitalization expectations and treatment goals this date.      Therapist provided education regarding different levels of care and is recommending residential treatment. Patient declines and requests to be scheduled with [facility] in [place] and or [facility] in [place], signed consent.      Therapist is recommending family involvement for safety and disposition planning prior to discharge. Patient claims to involve her family in her treatment..      CLINICAL MANUVERING/INTERVENTIONS:    Assisted Patient in processing session content; acknowledged and normalized patient´s thoughts, feelings, and concerns by utilizing a person-centered approach in efforts to build appropriate rapport and a positive therapeutic relationship with open and honest communication. Allowed patient to openly discuss current stressors and  "triggers for negative emotions and thoughts in a safe nonjudgmental environment with unconditional positive regard, active listening skills, and empathy.      ASSESSMENT: The Patient presented to the ED at [facility] reporting suicidal ideations stating that she is experiencing auditory hallucinations and the voices tell her all day long to kill herself.  S the patient was last admitted to this facility in January 2020.  She reports that she was also admitted to [facility] in February.  She has not received any follow-up care since those admissions.  She reports she was clean until June but when she attempted to get help social workers were contacted and attempted to remove her children.  The patient states her children are and have been in the care of her mother and the children's father.  The patient states I was clean until about 2 months ago except for heroin here and there to help me sleep.  It is the only thing that helps me.  I have not slept in 4 months and they will not give me Xanax or anything like that.  The patient states she thinks she \"got something bad\" last week.  She reported to the RN that she has been dealing with demons and the devil in the antichrist.        PLAN:   Patient will receive 24/7 nursing monitoring and daily psychiatrist evaluation by a multidisciplinary team.  Treatment team to stabilize patient's symptoms, provide individual and group therapy to focus on healthy coping skills, safe discharge planning, relapse prevention and appropriate follow-up care.     Patient will continue stabilization at this time.      Patient is agreeable for outpatient services with [facility] and [facility] in [place].      The patient drove herself to the hospital.        "

## 2020-11-18 PROCEDURE — 99232 SBSQ HOSP IP/OBS MODERATE 35: CPT | Performed by: PSYCHIATRY & NEUROLOGY

## 2020-11-18 RX ORDER — LACTULOSE 10 G/15ML
20 SOLUTION ORAL ONCE
Status: COMPLETED | OUTPATIENT
Start: 2020-11-18 | End: 2020-11-18

## 2020-11-18 RX ADMIN — MAGNESIUM HYDROXIDE 10 ML: 2400 SUSPENSION ORAL at 14:38

## 2020-11-18 RX ADMIN — LACTULOSE 20 G: 10 SOLUTION ORAL at 17:55

## 2020-11-18 RX ADMIN — IBUPROFEN 400 MG: 400 TABLET, FILM COATED ORAL at 14:37

## 2020-11-18 RX ADMIN — DOXEPIN HYDROCHLORIDE 50 MG: 25 CAPSULE ORAL at 21:18

## 2020-11-18 RX ADMIN — Medication 1 PATCH: at 09:21

## 2020-11-18 RX ADMIN — ARIPIPRAZOLE 5 MG: 10 TABLET ORAL at 09:21

## 2020-11-18 NOTE — PROGRESS NOTES
"INPATIENT PSYCHIATRIC PROGRESS NOTE    Name:  Laurel Fu  :  1990  MRN:  [id number]  Visit Number:  [id number]  Length of stay:  2    SUBJECTIVE  CC/Focus of Exam: psychosis    INTERVAL HISTORY:  The patient reports she is feeling better. States she has been able to sleep good and is also eating better and is no longer experiencing any hallucinations.   Depression rating 3/10  Anxiety rating 5/10  Sleep: good  Withdrawal sx: denies  Cravin/10    Review of Systems   Respiratory: Negative.    Cardiovascular: Negative.    Gastrointestinal: Positive for constipation.       OBJECTIVE    Temp:  [97.4 °F (36.3 °C)-98.1 °F (36.7 °C)] 98.1 °F (36.7 °C)  Heart Rate:  [] 116  Resp:  [18] 18  BP: (112-129)/(65-91) 129/91    MENTAL STATUS EXAM:  Appearance:Casually dressed, good hygeine.   Cooperation:Cooperative  Psychomotor: No psychomotor agitation/retardation, No EPS, No motor tics  Speech-normal rate, amount.  Mood \"better\"   Affect-congruent, appropriate, stable  Thought Content-goal directed, no delusional material present  Thought process-linear, organized.  Suicidality: No SI  Homicidality: No HI  Perception: No AH/VH  Insight-fair   Judgement-fair    Lab Results (last 24 hours)     ** No results found for the last 24 hours. **             Imaging Results (Last 24 Hours)     ** No results found for the last 24 hours. **             ECG/EMG Results (most recent)     Procedure Component Value Units Date/Time    ECG 12 Lead [[id number]] Collected: 20     Updated: 20 1849     QT Interval 382 ms      QTC Interval 415 ms     Narrative:      Test Reason : Baseline Cardiac Status  Blood Pressure : **/** mmHG  Vent. Rate : 071 BPM     Atrial Rate : 071 BPM     P-R Int : 140 ms          QRS Dur : 086 ms      QT Int : 382 ms       P-R-T Axes : 062 061 056 degrees     QTc Int : 415 ms    Normal sinus rhythm  Normal ECG  When compared with ECG of 15-FAVIOLA-2020 11:40,  No significant change was " found  Confirmed by Edwin Esquivel (2020) on 11/16/2020 6:49:10 PM    Referred By:             Confirmed By:Edwin Esquivel           ALLERGIES: Penicillins and Triple antibiotic [bacitracin-neomycin-polymyxin]      Current Facility-Administered Medications:   •  aluminum-magnesium hydroxide-simethicone (MAALOX MAX) 400-400-40 MG/5ML suspension 15 mL, 15 mL, Oral, Q6H PRN, Angelito Bautista MD  •  ARIPiprazole (ABILIFY) tablet 5 mg, 5 mg, Oral, Daily, Luanne Sotomayro MD, 5 mg at 11/18/20 0921  •  benzonatate (TESSALON) capsule 100 mg, 100 mg, Oral, TID PRN, Angelito Bautista MD  •  benztropine (COGENTIN) tablet 2 mg, 2 mg, Oral, Once PRN **OR** benztropine (COGENTIN) injection 1 mg, 1 mg, Intramuscular, Once PRN, Angelito Bautista MD  •  diphenhydrAMINE (BENADRYL) injection 50 mg, 50 mg, Intramuscular, Q6H PRN, Angelito Bautista MD, 50 mg at 11/16/20 1830  •  doxepin (SINEquan) capsule 50 mg, 50 mg, Oral, Nightly, Luanne Sotomayor MD, 50 mg at 11/17/20 2052  •  famotidine (PEPCID) tablet 20 mg, 20 mg, Oral, BID PRN, Angelito Bautista MD  •  haloperidol lactate (HALDOL) injection 5 mg, 5 mg, Intramuscular, Q6H PRN, Angelito Bautista MD, 5 mg at 11/16/20 1831  •  hydrOXYzine (ATARAX) tablet 50 mg, 50 mg, Oral, Q6H PRN, Angelito Bautista MD  •  ibuprofen (ADVIL,MOTRIN) tablet 400 mg, 400 mg, Oral, Q6H PRN, Angelito Bautista MD, 400 mg at 11/18/20 1437  •  loperamide (IMODIUM) capsule 2 mg, 2 mg, Oral, Q2H PRN, Angelito Bautista MD  •  LORazepam (ATIVAN) injection 2 mg, 2 mg, Intramuscular, Q6H PRN, Angelito Bautista MD, 2 mg at 11/16/20 1830  •  magnesium hydroxide (MILK OF MAGNESIA) suspension 2400 mg/10mL 10 mL, 10 mL, Oral, Daily PRN, Angelito Bautista MD, 10 mL at 11/18/20 1438  •  nicotine (NICODERM CQ) 21 MG/24HR patch 1 patch, 1 patch, Transdermal, Q24H, Angelito Bautista MD, 1 patch at 11/18/20 0921  •  ondansetron (ZOFRAN) tablet 4 mg, 4 mg, Oral, Q6H PRN, Angelito Bautista MD  •  sodium chloride nasal spray 2  spray, 2 spray, Each Nare, DANNY, Angelito Bautista MD    ASSESSMENT & PLAN:      Psychosis (CMS/HCC)  - Appears to be related to methamphetamine use. Will treat symptomatically.  - Continue aripiprazole and doxepin       Opioid use disorder, severe, dependence (CMS/HCC)  - Patient reports no use in about a week. Monitor for withdrawals.       Methamphetamine use disorder, severe (CMS/HCC)  - Supportive treatment       Hepatitis C  - Patient is aware of it, but has not been treated for it. She is encouraged to maintain sobriety and seek outpatient care for it.      Constipation  - Lactulose x1 then start Miralax    Special precautions: Special Precautions Level 3 (q15 min checks) .    Behavioral Health Treatment Plan and Problem List: I have reviewed and approved the Behavioral Health Treatment Plan and Problem list.  The patient has had a chance to review and agrees with the treatment plan.     Clinician:  Luanne Sotomayor MD  11/18/20  15:45 EST

## 2020-11-18 NOTE — PLAN OF CARE
Problem: Adult Behavioral Health Plan of Care  Goal: Optimized Coping Skills in Response to Life Stressors  Intervention: Promote Effective Coping Strategies  Flowsheets (Taken 11/18/2020 1607)  Supportive Measures: (Patient enjoys helping others; going to [employer])  • active listening utilized  • counseling provided  • decision-making supported  • goal setting facilitated  • problem-solving facilitated  • self-responsibility promoted  • self-reflection promoted  • self-care encouraged  • relaxation techniques promoted  • verbalization of feelings encouraged     Problem: Adult Behavioral Health Plan of Care  Goal: Develops/Participates in Therapeutic [place] to Support Successful Transition  Intervention: Foster Therapeutic [place]  Flowsheets (Taken 11/18/2020 1607)  Trust Relationship/Rapport:  • care explained  • choices provided  • emotional support provided  • empathic listening provided  • questions answered  • thoughts/feelings acknowledged  • reassurance provided  • questions encouraged     Problem: Adult Behavioral Health Plan of Care  Goal: Develops/Participates in Therapeutic [place] to Support Successful Transition  Intervention: Mutually Develop Transition Plan  Flowsheets (Taken 11/18/2020 1607)  Transition Support: (Patient will follow up with [facility])  • community resources reviewed  • follow-up care discussed  • follow-up care coordinated    Data: Therapist staffed case with Dr Sotomayor this date. Patient will plan to discharge tomorrow if she is stable.    Therapist met with patient one on one to assess needs. Patient states that she is feeling better today. Denies any hallucinations today. Stated that she just needed some rest. Patient stated that she had not been sleeping well for several months. Patient stated that she is doing well on medications. Patient's drug screen was positive for amphetamine at admission but she refuses long term treatment. Patient has limited  insight and judgement.     Assessment: Patient rates her anxiety at a 5; depression at a 3. Denies hallucinations this date. Patient is upset because she wants to discharge home.    Plan: Patient will most likely discharge tomorrow. Currently refusing family contact and residential referral. Patient has the following aftercare appts scheduled:     [facility]  [address]  [place] [address]   [phone]    12/15/2020 at 2:30pm with Dr High   Please bring insurance card and photo id.   Please fax discharge summary to:  [phone]      [facility]  [address] [place] [address] [place] [address] [phone]    November 20, 2020 at 4:00Pm with Aracely Ritchie. The office will call you at this time. You will not need to present to the office.

## 2020-11-18 NOTE — PLAN OF CARE
Goal Outcome Evaluation:  Plan of Care Reviewed With: patient  Progress: no change  Outcome Summary: Pt remains in her room lethargic. Denies any issues. Private room continues due to behaviors. Pt refuses to keep her gown on. Appears to be resting at this time.

## 2020-11-18 NOTE — PLAN OF CARE
Problem: Adult Behavioral Health Plan of Care  Goal: Plan of Care Review  Outcome: Ongoing, Progressing  Flowsheets (Taken 11/18/2020 1725)  Progress: improving  Plan of Care Reviewed With: patient  Patient Agreement with Plan of Care: agrees  Outcome Summary: Pt remained in her room for the majority of the shift. Pt was calm and cooperative with no issues or concerns.   Goal Outcome Evaluation:  Plan of Care Reviewed With: patient  Progress: improving  Outcome Summary: Pt remained in her room for the majority of the shift. Pt was calm and cooperative with no issues or concerns.

## 2020-11-19 VITALS
WEIGHT: 149.2 LBS | HEART RATE: 101 BPM | BODY MASS INDEX: 21.36 KG/M2 | DIASTOLIC BLOOD PRESSURE: 87 MMHG | RESPIRATION RATE: 18 BRPM | HEIGHT: 70 IN | OXYGEN SATURATION: 99 % | TEMPERATURE: 98.1 F | SYSTOLIC BLOOD PRESSURE: 141 MMHG

## 2020-11-19 PROCEDURE — 99238 HOSP IP/OBS DSCHRG MGMT 30/<: CPT | Performed by: PSYCHIATRY & NEUROLOGY

## 2020-11-19 RX ORDER — ARIPIPRAZOLE 5 MG/1
5 TABLET ORAL DAILY
Qty: 30 TABLET | Refills: 0 | Status: SHIPPED | OUTPATIENT
Start: 2020-11-20

## 2020-11-19 RX ORDER — DOXEPIN HYDROCHLORIDE 50 MG/1
50 CAPSULE ORAL NIGHTLY
Qty: 30 CAPSULE | Refills: 0 | Status: SHIPPED | OUTPATIENT
Start: 2020-11-19

## 2020-11-19 RX ADMIN — ARIPIPRAZOLE 5 MG: 10 TABLET ORAL at 08:09

## 2020-11-19 RX ADMIN — Medication 1 PATCH: at 08:09

## 2020-11-19 NOTE — DISCHARGE SUMMARY
":  1990  MRN:  [id number]  Visit Number:  [id number]      Date of Admission:2020   Date of Discharge:  2020    Discharge Diagnosis:  Principal Problem:    Psychosis (CMS/HCC)  Active Problems:    Opioid use disorder, severe, dependence (CMS/HCC)    Methamphetamine use disorder, severe (CMS/HCC)    Hepatitis C        Admission Diagnosis:  Psychosis (CMS/HCC) [F29]  Psychosis (CMS/HCC) [F29]     HPI  Laurel Fu is a 30 y.o. female who was admitted on 2020 with complaints of psychosis and bizarre thoughts and behaviors. She reported in the ED that she was fighting antichrist for the last four weeks. She reported that demons are inside her head and tell her to kill herself. She states she has not been sleeping good for past many days. States she was doing good without drugs for about six months but relapsed on meth and heroin about a month ago. She states she has not used any in 3-4 days, and was taking meth more than heroin.      Hospital Course  Patient is a 30 y.o. female presented with psychosis which appeared to be cause by her ongoing methamphetamine use and associated lack of sleep. She was admitted to adult psych unit for safety, further evaluation and treatment. The patient demanded to be put on Ambien for sleep, but due to potential for dependence and patient's history of substance use, she was offered trazodone or mirtazapine but she didn't want to take them but agreed to take doxepin. She was also started on Abilify for psychosis. The patient's mental status improved once she was able to get some good rest, and may not need to be on Abilify long term. She reported feeling better and was no longer verbalizing delusional or suicidal thoughts. She had some insight into her drug use and agreed to avoid using any further. She wanted to continue outpatient treatment in [place].      Mental Status Exam upon discharge:   Mood \"good\"   Affect-congruent, appropriate, stable  Thought " Content-goal directed, no delusional material present  Thought process-linear, organized.  Suicidality: No SI  Homicidality: No HI  Perception: No AH/VH    Procedures Performed         Consults:   Consults     No orders found from 10/18/2020 to 11/17/2020.          Pertinent Test Results: AST 35, rest of CMP is normal. CBC is grossly normal. UA normal. UDS positive for amphetamine.    Condition on Discharge:  improved    Vital Signs  Temp:  [97.4 °F (36.3 °C)-98.1 °F (36.7 °C)] 98.1 °F (36.7 °C)  Heart Rate:  [79-84] 79  Resp:  [18] 18  BP: (107-128)/(63-92) 107/63      Discharge Disposition:  Home or Self Care    Discharge Medications:     Discharge Medications      New Medications      Instructions Start Date   ARIPiprazole 5 MG tablet  Commonly known as: ABILIFY   5 mg, Oral, Daily   Start Date: November 20, 2020     doxepin 50 MG capsule  Commonly known as: SINEquan   50 mg, Oral, Nightly             Discharge Diet: Regular    Activity at Discharge: As tolerated    Follow-up Appointments      [facility]  [address]  [place] [address]   [phone]     12/15/2020 at 2:30pm with Dr High   Please bring insurance card and photo id.   Please fax discharge summary to:  [phone]         Time spent in discharge: < 30 min    Clinician:   Luanne Sotomayor MD  11/19/20  11:26 EST

## 2020-11-19 NOTE — PLAN OF CARE
Goal Outcome Evaluation:  Plan of Care Reviewed With: patient  Progress: improving   Dr. Sotomayor is discharging patient home today.

## 2020-11-19 NOTE — PLAN OF CARE
Goal Outcome Evaluation:  Plan of Care Reviewed With: patient  Progress: improving  Outcome Summary: Pt denies any issues. More alert and cooperative on this shift. No distress noted. Appears to be resting at this time.